# Patient Record
Sex: FEMALE | Race: OTHER | Employment: FULL TIME | URBAN - METROPOLITAN AREA
[De-identification: names, ages, dates, MRNs, and addresses within clinical notes are randomized per-mention and may not be internally consistent; named-entity substitution may affect disease eponyms.]

---

## 2017-02-13 ENCOUNTER — HOSPITAL ENCOUNTER (OUTPATIENT)
Dept: PHYSICAL THERAPY | Age: 32
Discharge: HOME | End: 2017-02-13
Payer: COMMERCIAL

## 2017-02-13 DIAGNOSIS — N94.10 DYSPAREUNIA, FEMALE: ICD-10-CM

## 2017-02-13 PROCEDURE — 97162 PT EVAL MOD COMPLEX 30 MIN: CPT

## 2017-02-13 PROCEDURE — 97110 THERAPEUTIC EXERCISES: CPT

## 2017-02-13 NOTE — PROGRESS NOTES
Ambulatory/Rehab Services H2 Model Falls Risk Assessment    Risk Factor Pts. ·   Confusion/Disorientation/Impulsivity  []    4 ·   Symptomatic Depression  []   2 ·   Altered Elimination  []   1 ·   Dizziness/Vertigo  []   1 ·   Gender (Male)  [x]   1 ·   Any administered antiepileptics (anticonvulsants):  []   2 ·   Any administered benzodiazepines:  []   1 ·   Visual Impairment (specify):  []   1 ·   Portable Oxygen Use  []   1 ·   Orthostatic ? BP  []   1 ·   History of Recent Falls (within 3 mos.)  []   5     Ability to Rise from Chair (choose one) Pts. ·   Ability to rise in a single movement  [x]   0 ·   Pushes up, successful in one attempt  []   1 ·   Multiple attempts, but successful  []   3 ·   Unable to rise without assistance  []   4   Total: (5 or greater = High Risk) 0     Falls Prevention Plan:   []                Physical Limitations to Exercise (specify):   []                Mobility Assistance Device (type):   []                Exercise/Equipment Adaptation (specify):    ©2010 [facility] [address] Patent #[id number].  Federal Law prohibits the replication, distribution or use without written permission from [facility]

## 2017-02-22 ENCOUNTER — HOSPITAL ENCOUNTER (OUTPATIENT)
Dept: PHYSICAL THERAPY | Age: 32
Discharge: HOME | End: 2017-02-22
Payer: COMMERCIAL

## 2017-02-22 PROCEDURE — 97140 MANUAL THERAPY 1/> REGIONS: CPT

## 2017-02-22 NOTE — PROGRESS NOTES
Erin Russell  : 1985 [facility] [place] [address], [facility] [address]  [phone]   [phone]          OUTPATIENT PHYSICAL THERAPY:Daily Note 2017    ICD-10: Treatment Diagnosis: Myalgia (M79.1)  Precautions/Allergies:   Penicillins   Fall Risk Score: 0 (? 5 = High Risk)  MD Orders: Eval and treat MEDICAL/REFERRING DIAGNOSIS:  Myalgia [M79.1]   DATE OF ONSET:10 + years  REFERRING PHYSICIAN: Mara Mott MD  RETURN PHYSICIAN APPOINTMENT: 1 year     INITIAL ASSESSMENT:  Ms. Miah Pedroza presents with increased tone of the pelvic floor and pain with scar tissue mobilization that may be contributing to her pelvic pain and sharp pain with intercourse. . She may also have some contributions from the pudendal n. She presents with tenderness at the external vaginal muscles as well as at the introitus and deep vaginal muscles of the LA and OI. She would benefit from skilled PT to address these deficits with the use of manual therapy, relaxation techniques, behavior modification and modalities as needed. PROBLEM LIST (Impacting functional limitations):  1. Decreased ADL/Functional Activities  2. Increased Pain  3. Decreased Flexibility/Joint Mobility INTERVENTIONS PLANNED:  1. Neuromuscular re-education  2. Biofeedback as needed  3. HEP  4. Electrical Stimulation  5. Manual Therapy  6. Range of Motion (ROM)  7. Therapeutic Activites  8. Therapeutic Exercise/Strengthening   TREATMENT PLAN:  Effective Dates: 2017 TO 2017. Frequency/Duration: 1 time a week for 12 weeks  GOALS: (Goals have been discussed and agreed upon with patient.)  Short-Term Functional Goals: Time Frame: in 3 weeks  1. Patient will demonstrate independence with home exercise program.  2. Pt will demonstrate good relaxation of pelvic floor mm upon command. Discharge Goals: Time Frame: in 12 weeks  1. Pt will be able to tolerate insertion of large dilator. 2. Pt will report no pain with intercourse during insertion or friction. Rehabilitation Potential For Stated Goals: Good  Regarding Lopez Edvin therapy, I certify that the treatment plan above will be carried out by a therapist or under their direction. Thank you for this referral,  Marvel Kahn, PT     Referring Physician Signature: Mag Lennon MD              Date                    The information in this section was collected on 2/13/2017 (except where otherwise noted). HISTORY:   Present Symptoms:  Pain Intensity 1: 0 Pain with intercourse. Pain is sharp. Pain is primarily at perineum. No hip or back problems. Pt reports that she is sore the next day in the perineum. History of Present Injury/Illness (Reason for Referral):  Has always had pain with intercourse. Had second child on August 25, 2017. Has not tried intercourse since then. Sister did have ovarian CA. Past Medical History/Comorbidities:   Ms. Sandra Lee  has a past medical history of Anemia; Ectopic pregnancy (2015); Epilepsy ([facility] [address] and Infertility, female. Ms. Sandra Lee  has a past surgical history that includes salpingo-oophorectomy; other surgical (April 2015); and other surgical (12/2002). Social History/Living Environment:    Lives with  and 2 children  Prior Level of Function/Work/Activity:  Mostly sitting at work. Personal Factors:          Past/Current Experience:  Has had pain with intercourse and pain with first tampon insertion. Does not have a left ovary. Current Medications:    Current Outpatient Prescriptions:     ferrous sulfate (IRON) 325 mg (65 mg iron) tablet, Take  by mouth Daily (before breakfast). , Disp: , Rfl:    Date Last Reviewed:  2/22/2017  Gynecological History:   · Number of pregnancies: 3 pregnancies  , vaginal 2,  · Episiotomy: did tear that was repaired   Past Urinary Medical History:    · History of UTI, Menopause: none significant  · Previous Treatments: has talked to an OB previously. Tried a cream that didn't do anything. Voiding Patterns:  Patient voids every 2-4 hours during the day and 0-1 times during the night. Patient reports that she empties bladder. Bowel Habits:  Patient demonstrates normal bowel habits. Mobility / Self Care: I  Personal / Social History:  · Sexually active? YES: pain is at entrance   Number of Personal Factors/Comorbidities that affect the Plan of Care: 1-2: MODERATE COMPLEXITY   EXAMINATION:   External Observation:   · Voluntary Contraction: present  · Voluntary Relaxation: delayed  · Involuntary Contraction: present  · Involuntary Relaxation: delayed  · Perineal Body Assessment: WNL  · Reflex: WNL  · Anal [place]: Weak  · Skin Integrity: WNL  · Q-tip Test: pain at 1:00, 5:00, 6:00, 7:00 and 11:00  · Vaginal Vault Size: within normal limits  ·   · Palpation:   Right Left   Bulbocavernosus     Ischocavernosus     Superficial Transverse Perineal tenderness tenderness   Sphincter Urethrae Tenderness 4/10 tenderness 2/10   Compressor Urethra  Tenderness 4/10    Urethra-vaginalis     Deep Transverse Perineium Tenderness 4/10    Obturator Internus     Iliococcygeus     Coccygeus     Pubococcygeus     Levator Ani Tenderness 4/10 Tenderness 2/10   Adductor     Psoas           Body Structures Involved:  1. Muscles  2. vestibule of vagina Body Functions Affected:  1. Genitourinary  2. Reproductive  3. Neuromusculoskeletal Activities and Participation Affected:  1. Interpersonal Interactions and Relationships   Number of elements that affect the Plan of Care: 3: MODERATE COMPLEXITY   CLINICAL PRESENTATION:   Presentation: Evolving clinical presentation with changing clinical characteristics: MODERATE COMPLEXITY   CLINICAL DECISION MAKING:   Outcome Measure: Tool Used: Pelvic Floor Disability Index (PFDI-20)  Score:  Initial: 8/10 Most Recent: X (Date: -- )   Interpretation of Score:   This survey asks questions concerning certain  bowel, bladder, or pelvic symptoms and how much this symptoms interfere with daily activiites. Each section is scored on a 0-4 scale, 4 representing the greatest disability. The scores of each section are added together for a total score out of 70. Score 0 1-13 14-27 28-41 42-55 56-69 70   Modifier CH CI CJ CK CL CM CN     Medical Necessity:   · Patient is expected to demonstrate progress in range of motion and coordination to increase independence with intercourse. · Patient demonstrates good rehab potential due to higher previous functional level. Reason for Services/Other Comments:  · Patient continues to require skilled intervention due to increase tenderness at vestibule and poor coordination of pelvic floor muscles contributing to painful intercoures. .   Use of outcome tool(s) and clinical judgement create a POC that gives a: Questionable prediction of patient's progress: MODERATE COMPLEXITY   TREATMENT:   (In addition to Assessment/Re-Assessment sessions the following treatments were rendered)  Pre-treatment Symptoms/Complaints:  Has not tried intercourse. Did well after treatment last week. Pain: Initial:   Pain Intensity 1: 0 0/10 Post Session:  0/10     THERAPEUTIC EXERCISE: (0 minutes):  Exercises per grid below to improve mobility and coordination. Required moderate verbal and tactile cues to promote proper body breathing techniques. Progressed repetitions and complexity of movement as indicated. MANUAL THERAPY: (45 minutes): Soft tissue mobilization was utilized and necessary because of the patient's restricted motion of soft tissue.      SCS and Trigger point to bilateral LA, OI, and perineum to reduce tone and improve tissue elasticity B   Dilators Amin size 2, and [place] size S+    Exercises:  Patient instructed in pelvic floor exercises listed below:   Date:  2/13/2017 Date:  2/22/2017 Date:     Activity/Exercise Parameters Parameters Parameters   Pt education  10 min        Adduction stretch 3 x 30 sec Perineum stretch 3 x 30 sec     Piriformis stretch 3 x 30 sec                           The following educational topics were reviewed with patient:  pelvic floor anatomy, dilators  · Treatment/Session Assessment:  Pt reported no pain with after treatment today. Making good progress. Will continue with dilators. · Response to Treatment:  Excellent   · Compliance with Program/Exercises: Will assess as treatment progresses. · Recommendations/Intent for next treatment session: \"Next visit will focus on advancements to more challenging activities\".   Total Treatment Duration:  PT Patient Time In/Time Out  Time In: 0830  Time Out: [facility],

## 2017-03-03 ENCOUNTER — HOSPITAL ENCOUNTER (OUTPATIENT)
Dept: PHYSICAL THERAPY | Age: 32
Discharge: HOME | End: 2017-03-03
Payer: COMMERCIAL

## 2017-03-03 PROCEDURE — 97140 MANUAL THERAPY 1/> REGIONS: CPT

## 2017-03-03 NOTE — PROGRESS NOTES
Melvi Haque  : 1985 [facility] [place] [address] [facility]  [phone]   [phone]          OUTPATIENT PHYSICAL THERAPY:Daily Note 3/3/2017    ICD-10: Treatment Diagnosis: Myalgia (M79.1)  Precautions/Allergies:   Penicillins   Fall Risk Score: 0 (? 5 = High Risk)  MD Orders: Eval and treat MEDICAL/REFERRING DIAGNOSIS:  Myalgia [M79.1]   DATE OF ONSET:10 + years  REFERRING PHYSICIAN: Sandy Gandara MD  RETURN PHYSICIAN APPOINTMENT: 1 year     INITIAL ASSESSMENT:  Ms. Gideon Esqueda presents with increased tone of the pelvic floor and pain with scar tissue mobilization that may be contributing to her pelvic pain and sharp pain with intercourse. . She may also have some contributions from the pudendal n. She presents with tenderness at the external vaginal muscles as well as at the introitus and deep vaginal muscles of the LA and OI. She would benefit from skilled PT to address these deficits with the use of manual therapy, relaxation techniques, behavior modification and modalities as needed. PROBLEM LIST (Impacting functional limitations):  1. Decreased ADL/Functional Activities  2. Increased Pain  3. Decreased Flexibility/Joint Mobility INTERVENTIONS PLANNED:  1. Neuromuscular re-education  2. Biofeedback as needed  3. HEP  4. Electrical Stimulation  5. Manual Therapy  6. Range of Motion (ROM)  7. Therapeutic Activites  8. Therapeutic Exercise/Strengthening   TREATMENT PLAN:  Effective Dates: 2017 TO 2017. Frequency/Duration: 1 time a week for 12 weeks  GOALS: (Goals have been discussed and agreed upon with patient.)  Short-Term Functional Goals: Time Frame: in 3 weeks  1. Patient will demonstrate independence with home exercise program.  2. Pt will demonstrate good relaxation of pelvic floor mm upon command. Discharge Goals: Time Frame: in 12 weeks  1. Pt will be able to tolerate insertion of large dilator. 2. Pt will report no pain with intercourse during insertion or friction. Rehabilitation Potential For Stated Goals: Good  Regarding Errol Chiu therapy, I certify that the treatment plan above will be carried out by a therapist or under their direction. Thank you for this referral,  Alfredo Weinstein, PT     Referring Physician Signature: Radha Cheatham MD              Date                    The information in this section was collected on 2/13/2017 (except where otherwise noted). HISTORY:   Present Symptoms:  Pain Intensity 1: 0 Pain with intercourse. Pain is sharp. Pain is primarily at perineum. No hip or back problems. Pt reports that she is sore the next day in the perineum. History of Present Injury/Illness (Reason for Referral):  Has always had pain with intercourse. Had second child on August 25, 2017. Has not tried intercourse since then. Sister did have ovarian CA. Past Medical History/Comorbidities:   Ms. Millicent Solorio  has a past medical history of Anemia; Ectopic pregnancy (2015); Epilepsy ([facility] [address] and Infertility, female. Ms. Millicent Solorio  has a past surgical history that includes salpingo-oophorectomy; other surgical (April 2015); and other surgical (12/2002). Social History/Living Environment:    Lives with  and 2 children  Prior Level of Function/Work/Activity:  Mostly sitting at work. Personal Factors:          Past/Current Experience:  Has had pain with intercourse and pain with first tampon insertion. Does not have a left ovary. Current Medications:    Current Outpatient Prescriptions:     ferrous sulfate (IRON) 325 mg (65 mg iron) tablet, Take  by mouth Daily (before breakfast). , Disp: , Rfl:    Date Last Reviewed:  3/3/2017  Gynecological History:   · Number of pregnancies: 3 pregnancies  , vaginal 2,  · Episiotomy: did tear that was repaired   Past Urinary Medical History:    · History of UTI, Menopause: none significant  · Previous Treatments: has talked to an OB previously. Tried a cream that didn't do anything. Voiding Patterns:  Patient voids every 2-4 hours during the day and 0-1 times during the night. Patient reports that she empties bladder. Bowel Habits:  Patient demonstrates normal bowel habits. Mobility / Self Care: I  Personal / Social History:  · Sexually active? YES: pain is at entrance   Number of Personal Factors/Comorbidities that affect the Plan of Care: 1-2: MODERATE COMPLEXITY   EXAMINATION:   External Observation:   · Voluntary Contraction: present  · Voluntary Relaxation: delayed  · Involuntary Contraction: present  · Involuntary Relaxation: delayed  · Perineal Body Assessment: WNL  · Reflex: WNL  · Anal [place]: Weak  · Skin Integrity: WNL  · Q-tip Test: pain at 1:00, 5:00, 6:00, 7:00 and 11:00  · Vaginal Vault Size: within normal limits  ·   · Palpation:   Right Left   Bulbocavernosus     Ischocavernosus     Superficial Transverse Perineal tenderness tenderness   Sphincter Urethrae Tenderness 4/10 tenderness 2/10   Compressor Urethra  Tenderness 4/10    Urethra-vaginalis     Deep Transverse Perineium Tenderness 4/10    Obturator Internus     Iliococcygeus     Coccygeus     Pubococcygeus     Levator Ani Tenderness 4/10 Tenderness 2/10   Adductor     Psoas           Body Structures Involved:  1. Muscles  2. vestibule of vagina Body Functions Affected:  1. Genitourinary  2. Reproductive  3. Neuromusculoskeletal Activities and Participation Affected:  1. Interpersonal Interactions and Relationships   Number of elements that affect the Plan of Care: 3: MODERATE COMPLEXITY   CLINICAL PRESENTATION:   Presentation: Evolving clinical presentation with changing clinical characteristics: MODERATE COMPLEXITY   CLINICAL DECISION MAKING:   Outcome Measure: Tool Used: Pelvic Floor Disability Index (PFDI-20)  Score:  Initial: 8/10 Most Recent: X (Date: -- )   Interpretation of Score:   This survey asks questions concerning certain  bowel, bladder, or pelvic symptoms and how much this symptoms interfere with daily activiites. Each section is scored on a 0-4 scale, 4 representing the greatest disability. The scores of each section are added together for a total score out of 70. Score 0 1-13 14-27 28-41 42-55 56-69 70   Modifier CH CI CJ CK CL CM CN     Medical Necessity:   · Patient is expected to demonstrate progress in range of motion and coordination to increase independence with intercourse. · Patient demonstrates good rehab potential due to higher previous functional level. Reason for Services/Other Comments:  · Patient continues to require skilled intervention due to increase tenderness at vestibule and poor coordination of pelvic floor muscles contributing to painful intercoures. .   Use of outcome tool(s) and clinical judgement create a POC that gives a: Questionable prediction of patient's progress: MODERATE COMPLEXITY   TREATMENT:   (In addition to Assessment/Re-Assessment sessions the following treatments were rendered)  Pre-treatment Symptoms/Complaints:  Pt states the she was more sore for about a day after her last treatment. Pain: Initial:   Pain Intensity 1: 0 0/10 Post Session:  1/10     THERAPEUTIC EXERCISE: (2 minutes):  Exercises per grid below to improve mobility and coordination. Required moderate verbal and tactile cues to promote proper body breathing techniques. Progressed repetitions and complexity of movement as indicated. MANUAL THERAPY: (55 minutes): Soft tissue mobilization was utilized and necessary because of the patient's restricted motion of soft tissue. SCS and Trigger point to bilateral LA, OI, and perineum to reduce tone and improve tissue elasticity B  Low light laser for 15 sec x 2 at perineum level 3.     Dilators [place] size S+    Exercises:  Patient instructed in pelvic floor exercises listed below:   Date:  2/13/2017 Date:  2/22/2017 Date:  3/3/2017   Activity/Exercise Parameters Parameters Parameters   Pt education  10 min        Adduction stretch 3 x 30 sec     Perineum stretch 3 x 30 sec     Piriformis stretch 3 x 30 sec                           The following educational topics were reviewed with patient:  pelvic floor anatomy, dilators  · Treatment/Session Assessment:  Pt did well with therapy. Some soreness at ischial tuberosity on the left. Pain = 1-2/10 after treatment. Making good progress. · Response to Treatment:  Excellent   · Compliance with Program/Exercises: Will assess as treatment progresses. · Recommendations/Intent for next treatment session: \"Next visit will focus on advancements to more challenging activities\".   Total Treatment Duration:  PT Patient Time In/Time Out  Time In: 0730  Time Out: 0830    Mary Yusuf, PT

## 2017-03-08 ENCOUNTER — HOSPITAL ENCOUNTER (OUTPATIENT)
Dept: PHYSICAL THERAPY | Age: 32
Discharge: HOME | End: 2017-03-08
Payer: COMMERCIAL

## 2017-03-08 PROCEDURE — 97140 MANUAL THERAPY 1/> REGIONS: CPT

## 2017-03-08 PROCEDURE — 97110 THERAPEUTIC EXERCISES: CPT

## 2017-03-08 NOTE — PROGRESS NOTES
Serina Caldera  : 1985 [facility] [place] [address], [address]  [phone]   [phone]          OUTPATIENT PHYSICAL THERAPY:Daily Note 3/8/2017    ICD-10: Treatment Diagnosis: Myalgia (M79.1)  Precautions/Allergies:   Penicillins   Fall Risk Score: 0 (? 5 = High Risk)  MD Orders: Eval and treat MEDICAL/REFERRING DIAGNOSIS:  Myalgia [M79.1]   DATE OF ONSET:10 + years  REFERRING PHYSICIAN: Mag Lennon MD  RETURN PHYSICIAN APPOINTMENT: 1 year     INITIAL ASSESSMENT:  Ms. Sandra Lee presents with increased tone of the pelvic floor and pain with scar tissue mobilization that may be contributing to her pelvic pain and sharp pain with intercourse. . She may also have some contributions from the pudendal n. She presents with tenderness at the external vaginal muscles as well as at the introitus and deep vaginal muscles of the LA and OI. She would benefit from skilled PT to address these deficits with the use of manual therapy, relaxation techniques, behavior modification and modalities as needed. PROBLEM LIST (Impacting functional limitations):  1. Decreased ADL/Functional Activities  2. Increased Pain  3. Decreased Flexibility/Joint Mobility INTERVENTIONS PLANNED:  1. Neuromuscular re-education  2. Biofeedback as needed  3. HEP  4. Electrical Stimulation  5. Manual Therapy  6. Range of Motion (ROM)  7. Therapeutic Activites  8. Therapeutic Exercise/Strengthening   TREATMENT PLAN:  Effective Dates: 2017 TO 2017. Frequency/Duration: 1 time a week for 12 weeks  GOALS: (Goals have been discussed and agreed upon with patient.)  Short-Term Functional Goals: Time Frame: in 3 weeks  1. Patient will demonstrate independence with home exercise program.  2. Pt will demonstrate good relaxation of pelvic floor mm upon command. Discharge Goals: Time Frame: in 12 weeks  1. Pt will be able to tolerate insertion of large dilator. 2. Pt will report no pain with intercourse during insertion or friction. Rehabilitation Potential For Stated Goals: Good  Regarding Mike Catalan therapy, I certify that the treatment plan above will be carried out by a therapist or under their direction. Thank you for this referral,  Isidro Arroyo, PT     Referring Physician Signature: Chucky Ortiz MD              Date                    The information in this section was collected on 2/13/2017 (except where otherwise noted). HISTORY:   Present Symptoms:  Pain Intensity 1: 0 Pain with intercourse. Pain is sharp. Pain is primarily at perineum. No hip or back problems. Pt reports that she is sore the next day in the perineum. History of Present Injury/Illness (Reason for Referral):  Has always had pain with intercourse. Had second child on August 25, 2017. Has not tried intercourse since then. Sister did have ovarian CA. Past Medical History/Comorbidities:   Ms. Jordi Lo  has a past medical history of Anemia; Ectopic pregnancy (2015); Epilepsy ([facility] [address] and Infertility, female. Ms. Jordi Lo  has a past surgical history that includes salpingo-oophorectomy; other surgical (April 2015); and other surgical (12/2002). Social History/Living Environment:    Lives with  and 2 children  Prior Level of Function/Work/Activity:  Mostly sitting at work. Personal Factors:          Past/Current Experience:  Has had pain with intercourse and pain with first tampon insertion. Does not have a left ovary. Current Medications:    Current Outpatient Prescriptions:     ferrous sulfate (IRON) 325 mg (65 mg iron) tablet, Take  by mouth Daily (before breakfast). , Disp: , Rfl:    Date Last Reviewed:  3/8/2017  Gynecological History:   · Number of pregnancies: 3 pregnancies  , vaginal 2,  · Episiotomy: did tear that was repaired   Past Urinary Medical History:    · History of UTI, Menopause: none significant  · Previous Treatments: has talked to an OB previously. Tried a cream that didn't do anything. Voiding Patterns:  Patient voids every 2-4 hours during the day and 0-1 times during the night. Patient reports that she empties bladder. Bowel Habits:  Patient demonstrates normal bowel habits. Mobility / Self Care: I  Personal / Social History:  · Sexually active? YES: pain is at entrance   Number of Personal Factors/Comorbidities that affect the Plan of Care: 1-2: MODERATE COMPLEXITY   EXAMINATION:   External Observation:   · Voluntary Contraction: present  · Voluntary Relaxation: delayed  · Involuntary Contraction: present  · Involuntary Relaxation: delayed  · Perineal Body Assessment: WNL  · Reflex: WNL  · Anal [place]: Weak  · Skin Integrity: WNL  · Q-tip Test: pain at 1:00, 5:00, 6:00, 7:00 and 11:00  · Vaginal Vault Size: within normal limits  ·   · Palpation:   Right Left   Bulbocavernosus     Ischocavernosus     Superficial Transverse Perineal tenderness tenderness   Sphincter Urethrae Tenderness 4/10 tenderness 2/10   Compressor Urethra  Tenderness 4/10    Urethra-vaginalis     Deep Transverse Perineium Tenderness 4/10    Obturator Internus     Iliococcygeus     Coccygeus     Pubococcygeus     Levator Ani Tenderness 4/10 Tenderness 2/10   Adductor     Psoas           Body Structures Involved:  1. Muscles  2. vestibule of vagina Body Functions Affected:  1. Genitourinary  2. Reproductive  3. Neuromusculoskeletal Activities and Participation Affected:  1. Interpersonal Interactions and Relationships   Number of elements that affect the Plan of Care: 3: MODERATE COMPLEXITY   CLINICAL PRESENTATION:   Presentation: Evolving clinical presentation with changing clinical characteristics: MODERATE COMPLEXITY   CLINICAL DECISION MAKING:   Outcome Measure: Tool Used: Pelvic Floor Disability Index (PFDI-20)  Score:  Initial: 8/10 Most Recent: X (Date: -- )   Interpretation of Score:   This survey asks questions concerning certain  bowel, bladder, or pelvic symptoms and how much this symptoms interfere with daily activiites. Each section is scored on a 0-4 scale, 4 representing the greatest disability. The scores of each section are added together for a total score out of 70. Score 0 1-13 14-27 28-41 42-55 56-69 70   Modifier CH CI CJ CK CL CM CN     Medical Necessity:   · Patient is expected to demonstrate progress in range of motion and coordination to increase independence with intercourse. · Patient demonstrates good rehab potential due to higher previous functional level. Reason for Services/Other Comments:  · Patient continues to require skilled intervention due to increase tenderness at vestibule and poor coordination of pelvic floor muscles contributing to painful intercoures. .   Use of outcome tool(s) and clinical judgement create a POC that gives a: Questionable prediction of patient's progress: MODERATE COMPLEXITY   TREATMENT:   (In addition to Assessment/Re-Assessment sessions the following treatments were rendered)  Pre-treatment Symptoms/Complaints:  Pt reported that she was less sore after treatment at her last visit. .  Doing well overall. Pain: Initial:   Pain Intensity 1: 0 0/10 Post Session:  1/10     THERAPEUTIC EXERCISE: (8 minutes):  Exercises per grid below to improve mobility and coordination. Required moderate verbal and tactile cues to promote proper body breathing techniques. Progressed repetitions and complexity of movement as indicated. MANUAL THERAPY: (45 minutes): Soft tissue mobilization was utilized and necessary because of the patient's restricted motion of soft tissue. SCS and Trigger point to bilateral LA, OI, and perineum to reduce tone and improve tissue elasticity B. Soft tissue to bilateral adductors  Low light laser for 15 sec x 2 at perineum level 3.     Dilators [place] size S+, M- and M      Exercises:  Patient instructed in pelvic floor exercises listed below:   Date:  3/8/2017   Activity/Exercise    Pt education     Adduction stretch 3 x 30 sec   Perineum stretch 3 x 30 sec   Piriformis stretch    Hamstring 3 x 30 sec               The following educational topics were reviewed with patient:  pelvic floor anatomy, dilators  · Treatment/Session Assessment:  Pt did excellent with therapy today. No increased soreness after treatment. Making good progress. · Response to Treatment:  Excellent   · Compliance with Program/Exercises: Will assess as treatment progresses. · Recommendations/Intent for next treatment session: \"Next visit will focus on advancements to more challenging activities\".   Total Treatment Duration:  PT Patient Time In/Time Out  Time In: 0730  Time Out: 0830    Ion Barragan PT

## 2017-03-13 ENCOUNTER — HOSPITAL ENCOUNTER (OUTPATIENT)
Dept: PHYSICAL THERAPY | Age: 32
Discharge: HOME | End: 2017-03-13
Payer: COMMERCIAL

## 2017-03-13 PROCEDURE — 97140 MANUAL THERAPY 1/> REGIONS: CPT

## 2017-03-13 PROCEDURE — 97110 THERAPEUTIC EXERCISES: CPT

## 2017-03-13 NOTE — PROGRESS NOTES
Johanna Chavez  : 1985 [facility]  [address], [address] [place] [address],[address] [facility]  [phone]   [phone]          OUTPATIENT PHYSICAL THERAPY:Daily Note 3/13/2017    ICD-10: Treatment Diagnosis: Myalgia (M79.1)  Precautions/Allergies:   Penicillins   Fall Risk Score: 0 (? 5 = High Risk)  MD Orders: Eval and treat MEDICAL/REFERRING DIAGNOSIS:  Myalgia [M79.1]   DATE OF ONSET:10 + years  REFERRING PHYSICIAN: Umer Summers MD  RETURN PHYSICIAN APPOINTMENT: 1 year     INITIAL ASSESSMENT:  Ms. Violeta Lozano presents with increased tone of the pelvic floor and pain with scar tissue mobilization that may be contributing to her pelvic pain and sharp pain with intercourse. . She may also have some contributions from the pudendal n. She presents with tenderness at the external vaginal muscles as well as at the introitus and deep vaginal muscles of the LA and OI. She would benefit from skilled PT to address these deficits with the use of manual therapy, relaxation techniques, behavior modification and modalities as needed. PROBLEM LIST (Impacting functional limitations):  1. Decreased ADL/Functional Activities  2. Increased Pain  3. Decreased Flexibility/Joint Mobility INTERVENTIONS PLANNED:  1. Neuromuscular re-education  2. Biofeedback as needed  3. HEP  4. Electrical Stimulation  5. Manual Therapy  6. Range of Motion (ROM)  7. Therapeutic Activites  8. Therapeutic Exercise/Strengthening   TREATMENT PLAN:  Effective Dates: 2017 TO 2017. Frequency/Duration: 1 time a week for 12 weeks  GOALS: (Goals have been discussed and agreed upon with patient.)  Short-Term Functional Goals: Time Frame: in 3 weeks  1. Patient will demonstrate independence with home exercise program.  2. Pt will demonstrate good relaxation of pelvic floor mm upon command. Discharge Goals: Time Frame: in 12 weeks  1. Pt will be able to tolerate insertion of large dilator. 2. Pt will report no pain with intercourse during insertion or friction. Rehabilitation Potential For Stated Goals: Good  Regarding Jason Naples therapy, I certify that the treatment plan above will be carried out by a therapist or under their direction. Thank you for this referral,  Darcy Camacho, PT     Referring Physician Signature: Debbi Cruz MD              Date                    The information in this section was collected on 2/13/2017 (except where otherwise noted). HISTORY:   Present Symptoms:  Pain Intensity 1: 0 Pain with intercourse. Pain is sharp. Pain is primarily at perineum. No hip or back problems. Pt reports that she is sore the next day in the perineum. History of Present Injury/Illness (Reason for Referral):  Has always had pain with intercourse. Had second child on August 25, 2017. Has not tried intercourse since then. Sister did have ovarian CA. Past Medical History/Comorbidities:   Ms. Yasmeen Barragan  has a past medical history of Anemia; Ectopic pregnancy (2015); Epilepsy ([facility] [address] and Infertility, female. Ms. Yasmeen Barragan  has a past surgical history that includes salpingo-oophorectomy; other surgical (April 2015); and other surgical (12/2002). Social History/Living Environment:    Lives with  and 2 children  Prior Level of Function/Work/Activity:  Mostly sitting at work. Personal Factors:          Past/Current Experience:  Has had pain with intercourse and pain with first tampon insertion. Does not have a left ovary. Current Medications:    Current Outpatient Prescriptions:     ferrous sulfate (IRON) 325 mg (65 mg iron) tablet, Take  by mouth Daily (before breakfast). , Disp: , Rfl:    Date Last Reviewed:  3/13/2017  Gynecological History:   · Number of pregnancies: 3 pregnancies  , vaginal 2,  · Episiotomy: did tear that was repaired   Past Urinary Medical History:    · History of UTI, Menopause: none significant  · Previous Treatments: has talked to an OB previously. Tried a cream that didn't do anything. Voiding Patterns:  Patient voids every 2-4 hours during the day and 0-1 times during the night. Patient reports that she empties bladder. Bowel Habits:  Patient demonstrates normal bowel habits. Mobility / Self Care: I  Personal / Social History:  · Sexually active? YES: pain is at entrance   Number of Personal Factors/Comorbidities that affect the Plan of Care: 1-2: MODERATE COMPLEXITY   EXAMINATION:   External Observation:   · Voluntary Contraction: present  · Voluntary Relaxation: delayed  · Involuntary Contraction: present  · Involuntary Relaxation: delayed  · Perineal Body Assessment: WNL  · Reflex: WNL  · Anal [place]: Weak  · Skin Integrity: WNL  · Q-tip Test: pain at 1:00, 5:00, 6:00, 7:00 and 11:00  · Vaginal Vault Size: within normal limits  ·   · Palpation:   Right Left   Bulbocavernosus     Ischocavernosus     Superficial Transverse Perineal tenderness tenderness   Sphincter Urethrae Tenderness 4/10 tenderness 2/10   Compressor Urethra  Tenderness 4/10    Urethra-vaginalis     Deep Transverse Perineium Tenderness 4/10    Obturator Internus     Iliococcygeus     Coccygeus     Pubococcygeus     Levator Ani Tenderness 4/10 Tenderness 2/10   Adductor     Psoas           Body Structures Involved:  1. Muscles  2. vestibule of vagina Body Functions Affected:  1. Genitourinary  2. Reproductive  3. Neuromusculoskeletal Activities and Participation Affected:  1. Interpersonal Interactions and Relationships   Number of elements that affect the Plan of Care: 3: MODERATE COMPLEXITY   CLINICAL PRESENTATION:   Presentation: Evolving clinical presentation with changing clinical characteristics: MODERATE COMPLEXITY   CLINICAL DECISION MAKING:   Outcome Measure: Tool Used: Pelvic Floor Disability Index (PFDI-20)  Score:  Initial: 8/10 Most Recent: X (Date: -- )   Interpretation of Score:   This survey asks questions concerning certain  bowel, bladder, or pelvic symptoms and how much this symptoms interfere with daily activiites. Each section is scored on a 0-4 scale, 4 representing the greatest disability. The scores of each section are added together for a total score out of 70. Score 0 1-13 14-27 28-41 42-55 56-69 70   Modifier CH CI CJ CK CL CM CN     Medical Necessity:   · Patient is expected to demonstrate progress in range of motion and coordination to increase independence with intercourse. · Patient demonstrates good rehab potential due to higher previous functional level. Reason for Services/Other Comments:  · Patient continues to require skilled intervention due to increase tenderness at vestibule and poor coordination of pelvic floor muscles contributing to painful intercoures. .   Use of outcome tool(s) and clinical judgement create a POC that gives a: Questionable prediction of patient's progress: MODERATE COMPLEXITY   TREATMENT:   (In addition to Assessment/Re-Assessment sessions the following treatments were rendered)  Pre-treatment Symptoms/Complaints: Pt reported pain at the ischial tuberosities after therapy for several hours. Has not tried intercourse yet. Pain: Initial:   Pain Intensity 1: 0 0/10 Post Session:  0/10     THERAPEUTIC EXERCISE: (10 minutes):  Exercises per grid below to improve mobility and coordination. Required moderate verbal and tactile cues to promote proper body breathing techniques. Progressed repetitions and complexity of movement as indicated. MANUAL THERAPY: (45 minutes): Soft tissue mobilization was utilized and necessary because of the patient's restricted motion of soft tissue. SCS and Trigger point to bilateral LA, OI, and perineum to reduce tone and improve tissue elasticity B. Soft tissue to bilateral adductors  Low light laser for 15 sec x 2 at perineum level 3.     Dilators Amin size 4 and 5 no pain noted    Exercises:  Patient instructed in pelvic floor exercises listed below:   Date:  3/8/2017 Date:  3/13/2017   Activity/Exercise     Pt education      Adduction stretch 3 x 30 sec 3 x 30 sec   Perineum stretch 3 x 30 sec 3 x 30 sec   Piriformis stretch     Hamstring 3 x 30 sec 3 x 30 sec   Prone heel squeezes  X 5 x 10 sec   Prone hip extension  X 10 each       The following educational topics were reviewed with patient:  pelvic floor anatomy, dilators  · Treatment/Session Assessment: Pt reported no increased pain after treatment. · Response to Treatment:  Excellent   · Compliance with Program/Exercises: Will assess as treatment progresses. · Recommendations/Intent for next treatment session: \"Next visit will focus on advancements to more challenging activities\".   Total Treatment Duration:  PT Patient Time In/Time Out  Time In: 0730  Time Out: 0830    Kingsley Turcios, PT

## 2017-03-17 ENCOUNTER — HOSPITAL ENCOUNTER (OUTPATIENT)
Dept: PHYSICAL THERAPY | Age: 32
Discharge: HOME | End: 2017-03-17
Payer: COMMERCIAL

## 2017-03-17 PROCEDURE — 97140 MANUAL THERAPY 1/> REGIONS: CPT

## 2017-03-22 ENCOUNTER — HOSPITAL ENCOUNTER (OUTPATIENT)
Dept: PHYSICAL THERAPY | Age: 32
Discharge: HOME | End: 2017-03-22
Payer: COMMERCIAL

## 2017-03-22 PROCEDURE — 97110 THERAPEUTIC EXERCISES: CPT

## 2017-03-22 PROCEDURE — 97140 MANUAL THERAPY 1/> REGIONS: CPT

## 2017-03-22 NOTE — PROGRESS NOTES
Yves Dang  : 1985 [facility] [place] [address], [address] [facility]  [phone]   [phone]          OUTPATIENT PHYSICAL THERAPY:Daily Note and Progress Report 3/22/2017    ICD-10: Treatment Diagnosis: Myalgia (M79.1)  Precautions/Allergies:   Penicillins   Fall Risk Score: 0 (? 5 = High Risk)  MD Orders: Eval and treat MEDICAL/REFERRING DIAGNOSIS:  Myalgia [M79.1]   DATE OF ONSET:10 + years  REFERRING PHYSICIAN: Isabella Anaya MD  RETURN PHYSICIAN APPOINTMENT: 1 year     INITIAL ASSESSMENT:  Ms. Mason Campos has been seen for 8 physical therapy visits. She has good improvements in the 3rd layer of the pelvic floor with reports of tenderness and decreased tone. Pt is now able to tolerate insertion of 5 Amin dilator. She has met all of short term goals and is working towards her long term goals. She does continue to have increased tenderness in superficial and deep perirenal. She continues to benefit from skilled PT to address remaining deficits and meet long term goals. PROBLEM LIST (Impacting functional limitations):  1. Decreased ADL/Functional Activities  2. Increased Pain  3. Decreased Flexibility/Joint Mobility INTERVENTIONS PLANNED:  1. Neuromuscular re-education  2. Biofeedback as needed  3. HEP  4. Electrical Stimulation  5. Manual Therapy  6. Range of Motion (ROM)  7. Therapeutic Activites  8. Therapeutic Exercise/Strengthening   TREATMENT PLAN:  Effective Dates: 2017 TO 2017. Frequency/Duration: 1 time a week for 12 weeks  GOALS: (Goals have been discussed and agreed upon with patient.)  Short-Term Functional Goals: Time Frame: in 3 weeks  1. Patient will demonstrate independence with home exercise program. (met)  2. Pt will demonstrate good relaxation of pelvic floor mm upon command. (met)  Discharge Goals: Time Frame: in 12 weeks  1. Pt will be able to tolerate insertion of large dilator.    2. Pt will report no pain with intercourse during insertion or friction. Rehabilitation Potential For Stated Goals: Good  Regarding George Lópezmann therapy, I certify that the treatment plan above will be carried out by a therapist or under their direction. Thank you for this referral,  Los Rodriges, PT     Referring Physician Signature: Zoran Ruiz MD              Date                    The information in this section was collected on 2/13/2017 (except where otherwise noted). HISTORY:   Present Symptoms:  Pain Intensity 1: 0 Pain with intercourse. Pain is sharp. Pain is primarily at perineum. No hip or back problems. Pt reports that she is sore the next day in the perineum. History of Present Injury/Illness (Reason for Referral):   Has always had pain with intercourse. Had second child on August 25, 2017. Has not tried intercourse since then. Sister did have ovarian CA. Past Medical History/Comorbidities:   Ms. Lawrence Ross  has a past medical history of Anemia; Ectopic pregnancy (2015); Epilepsy ([facility] [address] and Infertility, female. Ms. Lawrence Ross  has a past surgical history that includes salpingo-oophorectomy; other surgical (April 2015); and other surgical (12/2002). Social History/Living Environment:    Lives with  and 2 children  Prior Level of Function/Work/Activity:  Mostly sitting at work. Personal Factors:          Past/Current Experience:  Has had pain with intercourse and pain with first tampon insertion. Does not have a left ovary. Current Medications:    Current Outpatient Prescriptions:     ferrous sulfate (IRON) 325 mg (65 mg iron) tablet, Take  by mouth Daily (before breakfast). , Disp: , Rfl:    Date Last Reviewed:  3/22/2017  Gynecological History:   · Number of pregnancies: 3 pregnancies  , vaginal 2,  · Episiotomy: did tear that was repaired   Past Urinary Medical History:    · History of UTI, Menopause: none significant  · Previous Treatments: has talked to an OB previously. Tried a cream that didn't do anything. Voiding Patterns:  Patient voids every 2-4 hours during the day and 0-1 times during the night. Patient reports that she empties bladder. Bowel Habits:  Patient demonstrates normal bowel habits. Mobility / Self Care: I  Personal / Social History:  · Sexually active? YES: pain is at entrance   Number of Personal Factors/Comorbidities that affect the Plan of Care: 1-2: MODERATE COMPLEXITY   EXAMINATION:   External Observation:   · Voluntary Contraction: present  · Voluntary Relaxation: delayed  · Involuntary Contraction: present  · Involuntary Relaxation: delayed  · Perineal Body Assessment: WNL  · Reflex: WNL  · Anal [place]: Weak  · Skin Integrity: WNL  · Q-tip Test: pain at 1:00, 5:00, 6:00, 7:00 and 11:00  · Vaginal Vault Size: within normal limits  ·   · Palpation:   Right Left   Bulbocavernosus     Ischocavernosus     Superficial Transverse Perineal tenderness tenderness   Sphincter Urethrae Tenderness 4/10 tenderness 2/10   Compressor Urethra  Tenderness 4/10    Urethra-vaginalis     Deep Transverse Perineium Tenderness 4/10    Obturator Internus     Iliococcygeus     Coccygeus     Pubococcygeus     Levator Ani  Tenderness 2/10   Adductor     Psoas           Body Structures Involved:  1. Muscles  2. vestibule of vagina Body Functions Affected:  1. Genitourinary  2. Reproductive  3. Neuromusculoskeletal Activities and Participation Affected:  1. Interpersonal Interactions and Relationships   Number of elements that affect the Plan of Care: 3: MODERATE COMPLEXITY   CLINICAL PRESENTATION:   Presentation: Evolving clinical presentation with changing clinical characteristics: MODERATE COMPLEXITY   CLINICAL DECISION MAKING:   Outcome Measure: Tool Used: Pelvic Floor Disability Index (PFDI-20)  Score:  Initial: 8/10 Most Recent: X (Date: -- )   Interpretation of Score:   This survey asks questions concerning certain  bowel, bladder, or pelvic symptoms and how much this symptoms interfere with daily activiites. Each section is scored on a 0-4 scale, 4 representing the greatest disability. The scores of each section are added together for a total score out of 70. Score 0 1-13 14-27 28-41 42-55 56-69 70   Modifier CH CI CJ CK CL CM CN     Medical Necessity:   · Patient is expected to demonstrate progress in range of motion and coordination to increase independence with intercourse. · Patient demonstrates good rehab potential due to higher previous functional level. Reason for Services/Other Comments:  · Patient continues to require skilled intervention due to increase tenderness at vestibule and poor coordination of pelvic floor muscles contributing to painful intercoures. .   Use of outcome tool(s) and clinical judgement create a POC that gives a: Questionable prediction of patient's progress: MODERATE COMPLEXITY   TREATMENT:   (In addition to Assessment/Re-Assessment sessions the following treatments were rendered)  Pre-treatment Symptoms/Complaints: Pt reported pain at the ischial tuberosities after therapy for a 1-2 hours. Has not tried intercourse yet. Pain: Initial:   Pain Intensity 1: 0 0/10 Post Session:  0/10     THERAPEUTIC EXERCISE: (15 minutes):  Exercises per grid below to improve mobility and coordination. Required moderate verbal and tactile cues to promote proper body breathing techniques. Progressed repetitions and complexity of movement as indicated. MANUAL THERAPY: (40 minutes): Soft tissue mobilization was utilized and necessary because of the patient's restricted motion of soft tissue. SCS and Trigger point to bilateral LA, OI, and perineum to reduce tone and improve tissue elasticity B. Soft tissue to bilateral adductors  Low light laser for 15 sec x 2 at perineum level 3. Dilators Amin size 4 and 5, and 6  no pain noted 4, 5.  Dilator 6 had pain at 1-3/10    Exercises:  Patient instructed in pelvic floor exercises listed below: Date:  3/8/2017 Date:  3/13/2017 Date:  3/22/2017   Activity/Exercise      Pt education       Adduction stretch 3 x 30 sec 3 x 30 sec 3 x 30 sec   Perineum stretch 3 x 30 sec 3 x 30 sec 3 x 30 sec   Piriformis stretch      Hamstring 3 x 30 sec 3 x 30 sec 3 x 30 sec   Prone heel squeezes  X 5 x 10 sec x5 x 10 sec   Prone hip extension  X 10 each X 10 each       The following educational topics were reviewed with patient:  pelvic floor anatomy, dilators  · Treatment/Session Assessment: She did have some pain with dilators at 2/10. Pain resolved quickly. No pain after treatment. · Response to Treatment:  Excellent   · Compliance with Program/Exercises: Will assess as treatment progresses. · Recommendations/Intent for next treatment session: \"Next visit will focus on advancements to more challenging activities\".   Total Treatment Duration:  PT Patient Time In/Time Out  Time In: 1000  Time Out: 1100    Marvel Kahn PT

## 2017-03-24 ENCOUNTER — APPOINTMENT (OUTPATIENT)
Dept: PHYSICAL THERAPY | Age: 32
End: 2017-03-24
Payer: COMMERCIAL

## 2017-03-31 ENCOUNTER — HOSPITAL ENCOUNTER (OUTPATIENT)
Dept: PHYSICAL THERAPY | Age: 32
Discharge: HOME | End: 2017-03-31
Payer: COMMERCIAL

## 2017-03-31 PROCEDURE — 97140 MANUAL THERAPY 1/> REGIONS: CPT

## 2017-03-31 NOTE — PROGRESS NOTES
Eli Flower  : 1985 [facility] [place] [address]  [phone]   [phone]          OUTPATIENT PHYSICAL THERAPY:Daily Note 3/31/2017    ICD-10: Treatment Diagnosis: Myalgia (M79.1)  Precautions/Allergies:   Penicillins   Fall Risk Score: 0 (? 5 = High Risk)  MD Orders: Eval and treat MEDICAL/REFERRING DIAGNOSIS:  Myalgia [M79.1]   DATE OF ONSET:10 + years  REFERRING PHYSICIAN: Tamika Meneses MD  RETURN PHYSICIAN APPOINTMENT: 1 year     INITIAL ASSESSMENT:  Ms. Nedra Oliva has been seen for 8 physical therapy visits. She has good improvements in the 3rd layer of the pelvic floor with reports of tenderness and decreased tone. Pt is now able to tolerate insertion of 5 Amin dilator. She has met all of short term goals and is working towards her long term goals. She does continue to have increased tenderness in superficial and deep perirenal. She continues to benefit from skilled PT to address remaining deficits and meet long term goals. PROBLEM LIST (Impacting functional limitations):  1. Decreased ADL/Functional Activities  2. Increased Pain  3. Decreased Flexibility/Joint Mobility INTERVENTIONS PLANNED:  1. Neuromuscular re-education  2. Biofeedback as needed  3. HEP  4. Electrical Stimulation  5. Manual Therapy  6. Range of Motion (ROM)  7. Therapeutic Activites  8. Therapeutic Exercise/Strengthening   TREATMENT PLAN:  Effective Dates: 2017 TO 2017. Frequency/Duration: 1 time a week for 12 weeks  GOALS: (Goals have been discussed and agreed upon with patient.)  Short-Term Functional Goals: Time Frame: in 3 weeks  1. Patient will demonstrate independence with home exercise program. (met)  2. Pt will demonstrate good relaxation of pelvic floor mm upon command. (met)  Discharge Goals: Time Frame: in 12 weeks  1. Pt will be able to tolerate insertion of large dilator.    2. Pt will report no pain with intercourse during insertion or friction. Rehabilitation Potential For Stated Goals: Good  Regarding Lulu Heal therapy, I certify that the treatment plan above will be carried out by a therapist or under their direction. Thank you for this referral,  Duyen Rai PT     Referring Physician Signature: Elenita Villalta MD              Date                    The information in this section was collected on 2/13/2017 (except where otherwise noted). HISTORY:   Present Symptoms:  Pain Intensity 1: 0 Pain with intercourse. Pain is sharp. Pain is primarily at perineum. No hip or back problems. Pt reports that she is sore the next day in the perineum. History of Present Injury/Illness (Reason for Referral):   Has always had pain with intercourse. Had second child on August 25, 2017. Has not tried intercourse since then. Sister did have ovarian CA. Past Medical History/Comorbidities:   Ms. Dk Nash  has a past medical history of Anemia; Ectopic pregnancy (2015); Epilepsy ([facility] [address] and Infertility, female. Ms. kD Nash  has a past surgical history that includes salpingo-oophorectomy; other surgical (April 2015); and other surgical (12/2002). Social History/Living Environment:    Lives with  and 2 children  Prior Level of Function/Work/Activity:  Mostly sitting at work. Personal Factors:          Past/Current Experience:  Has had pain with intercourse and pain with first tampon insertion. Does not have a left ovary. Current Medications:    Current Outpatient Prescriptions:     ferrous sulfate (IRON) 325 mg (65 mg iron) tablet, Take  by mouth Daily (before breakfast). , Disp: , Rfl:    Date Last Reviewed:  3/31/2017  Gynecological History:   · Number of pregnancies: 3 pregnancies  , vaginal 2,  · Episiotomy: did tear that was repaired   Past Urinary Medical History:    · History of UTI, Menopause: none significant  · Previous Treatments: has talked to an OB previously.   Tried a cream that didn't do anything. Voiding Patterns:  Patient voids every 2-4 hours during the day and 0-1 times during the night. Patient reports that she empties bladder. Bowel Habits:  Patient demonstrates normal bowel habits. Mobility / Self Care: I  Personal / Social History:  · Sexually active? YES: pain is at entrance   Number of Personal Factors/Comorbidities that affect the Plan of Care: 1-2: MODERATE COMPLEXITY   EXAMINATION:   External Observation:   · Voluntary Contraction: present  · Voluntary Relaxation: delayed  · Involuntary Contraction: present  · Involuntary Relaxation: delayed  · Perineal Body Assessment: WNL  · Reflex: WNL  · Anal [place]: Weak  · Skin Integrity: WNL  · Q-tip Test: pain at 1:00, 5:00, 6:00, 7:00 and 11:00  · Vaginal Vault Size: within normal limits  ·   · Palpation:   Right Left   Bulbocavernosus     Ischocavernosus     Superficial Transverse Perineal tenderness tenderness   Sphincter Urethrae Tenderness 4/10 tenderness 2/10   Compressor Urethra  Tenderness 4/10    Urethra-vaginalis     Deep Transverse Perineium Tenderness 4/10    Obturator Internus     Iliococcygeus     Coccygeus     Pubococcygeus     Levator Ani  Tenderness 2/10   Adductor     Psoas           Body Structures Involved:  1. Muscles  2. vestibule of vagina Body Functions Affected:  1. Genitourinary  2. Reproductive  3. Neuromusculoskeletal Activities and Participation Affected:  1. Interpersonal Interactions and Relationships   Number of elements that affect the Plan of Care: 3: MODERATE COMPLEXITY   CLINICAL PRESENTATION:   Presentation: Evolving clinical presentation with changing clinical characteristics: MODERATE COMPLEXITY   CLINICAL DECISION MAKING:   Outcome Measure: Tool Used: Pelvic Floor Disability Index (PFDI-20)  Score:  Initial: 8/10 Most Recent: X (Date: -- )   Interpretation of Score:   This survey asks questions concerning certain  bowel, bladder, or pelvic symptoms and how much this symptoms interfere with daily activiites. Each section is scored on a 0-4 scale, 4 representing the greatest disability. The scores of each section are added together for a total score out of 70. Score 0 1-13 14-27 28-41 42-55 56-69 70   Modifier CH CI CJ CK CL CM CN     Medical Necessity:   · Patient is expected to demonstrate progress in range of motion and coordination to increase independence with intercourse. · Patient demonstrates good rehab potential due to higher previous functional level. Reason for Services/Other Comments:  · Patient continues to require skilled intervention due to increase tenderness at vestibule and poor coordination of pelvic floor muscles contributing to painful intercoures. .   Use of outcome tool(s) and clinical judgement create a POC that gives a: Questionable prediction of patient's progress: MODERATE COMPLEXITY   TREATMENT:   (In addition to Assessment/Re-Assessment sessions the following treatments were rendered)  Pre-treatment Symptoms/Complaints: Doing well   Pain: Initial:   Pain Intensity 1: 0 0/10 Post Session:  0/10     THERAPEUTIC EXERCISE: (0 minutes):  Exercises per grid below to improve mobility and coordination. Required moderate verbal and tactile cues to promote proper body breathing techniques. Progressed repetitions and complexity of movement as indicated. MANUAL THERAPY: (55 minutes): Soft tissue mobilization was utilized and necessary because of the patient's restricted motion of soft tissue. SCS and Trigger point to bilateral LA, OI, and perineum to reduce tone and improve tissue elasticity B. Soft tissue to bilateral adductors  Low light laser for 15 sec x 2 at perineum level 3. Dilators Amin size 4 and 5, and 6  no pain noted 4, 5.  Dilator 4 had pain at 1/10    Exercises:  Patient instructed in pelvic floor exercises listed below:   Date:  3/8/2017 Date:  3/13/2017 Date:  3/22/2017   Activity/Exercise      Pt education       Adduction stretch 3 x 30 sec 3 x 30 sec 3 x 30 sec   Perineum stretch 3 x 30 sec 3 x 30 sec 3 x 30 sec   Piriformis stretch      Hamstring 3 x 30 sec 3 x 30 sec 3 x 30 sec   Prone heel squeezes  X 5 x 10 sec x5 x 10 sec   Prone hip extension  X 10 each X 10 each       The following educational topics were reviewed with patient:  pelvic floor anatomy, dilators  · Treatment/Session Assessment: She did have some pain with first dilator at perineum at 1/10. Pain resolved quickly. No pain with any other dilator today. She has some tenderness at perineum after treatment of 1/10. · Response to Treatment:  Excellent   · Compliance with Program/Exercises: Will assess as treatment progresses. · Recommendations/Intent for next treatment session: \"Next visit will focus on advancements to more challenging activities\".   Total Treatment Duration:  PT Patient Time In/Time Out  Time In: 0730  Time Out: 0830    Liliana Carmichael, PT

## 2017-04-07 ENCOUNTER — HOSPITAL ENCOUNTER (OUTPATIENT)
Dept: PHYSICAL THERAPY | Age: 32
Discharge: HOME | End: 2017-04-07
Payer: COMMERCIAL

## 2017-04-07 PROCEDURE — 97140 MANUAL THERAPY 1/> REGIONS: CPT

## 2017-04-07 PROCEDURE — 97110 THERAPEUTIC EXERCISES: CPT

## 2017-04-07 NOTE — PROGRESS NOTES
Marcio Anglees  : 1985 [facility]  [address] [facility]  [phone]   [phone]          OUTPATIENT PHYSICAL THERAPY:Daily Note 2017    ICD-10: Treatment Diagnosis: Myalgia (M79.1)  Precautions/Allergies:   Penicillins   Fall Risk Score: 0 (? 5 = High Risk)  MD Orders: Eval and treat MEDICAL/REFERRING DIAGNOSIS:  Myalgia [M79.1]   DATE OF ONSET:10 + years  REFERRING PHYSICIAN: Sujatha Burrell MD  RETURN PHYSICIAN APPOINTMENT: 1 year     INITIAL ASSESSMENT:  Ms. Lalito Uriostegui has been seen for 8 physical therapy visits. She has good improvements in the 3rd layer of the pelvic floor with reports of tenderness and decreased tone. Pt is now able to tolerate insertion of 5 Amin dilator. She has met all of short term goals and is working towards her long term goals. She does continue to have increased tenderness in superficial and deep perirenal. She continues to benefit from skilled PT to address remaining deficits and meet long term goals. PROBLEM LIST (Impacting functional limitations):  1. Decreased ADL/Functional Activities  2. Increased Pain  3. Decreased Flexibility/Joint Mobility INTERVENTIONS PLANNED:  1. Neuromuscular re-education  2. Biofeedback as needed  3. HEP  4. Electrical Stimulation  5. Manual Therapy  6. Range of Motion (ROM)  7. Therapeutic Activites  8. Therapeutic Exercise/Strengthening   TREATMENT PLAN:  Effective Dates: 2017 TO 2017. Frequency/Duration: 1 time a week for 12 weeks  GOALS: (Goals have been discussed and agreed upon with patient.)  Short-Term Functional Goals: Time Frame: in 3 weeks  1. Patient will demonstrate independence with home exercise program. (met)  2. Pt will demonstrate good relaxation of pelvic floor mm upon command. (met)  Discharge Goals: Time Frame: in 12 weeks  1. Pt will be able to tolerate insertion of large dilator.    2. Pt will report no pain with intercourse during insertion or friction. Rehabilitation Potential For Stated Goals: Good  Regarding Alcides Alfaro therapy, I certify that the treatment plan above will be carried out by a therapist or under their direction. Thank you for this referral,  Jenna Samano, PT     Referring Physician Signature: Mica Maddox MD              Date                    The information in this section was collected on 2/13/2017 (except where otherwise noted). HISTORY:   Present Symptoms:  Pain Intensity 1: 0 Pain with intercourse. Pain is sharp. Pain is primarily at perineum. No hip or back problems. Pt reports that she is sore the next day in the perineum. History of Present Injury/Illness (Reason for Referral):   Has always had pain with intercourse. Had second child on August 25, 2017. Has not tried intercourse since then. Sister did have ovarian CA. Past Medical History/Comorbidities:   Ms. Mariya Sumner  has a past medical history of Anemia; Ectopic pregnancy (2015); Epilepsy ([facility] [address] and Infertility, female. Ms. Mariya Sumner  has a past surgical history that includes salpingo-oophorectomy; other surgical (April 2015); and other surgical (12/2002). Social History/Living Environment:    Lives with  and 2 children  Prior Level of Function/Work/Activity:  Mostly sitting at work. Personal Factors:          Past/Current Experience:  Has had pain with intercourse and pain with first tampon insertion. Does not have a left ovary. Current Medications:    Current Outpatient Prescriptions:     ferrous sulfate (IRON) 325 mg (65 mg iron) tablet, Take  by mouth Daily (before breakfast). , Disp: , Rfl:    Date Last Reviewed:  4/7/2017  Gynecological History:   · Number of pregnancies: 3 pregnancies  , vaginal 2,  · Episiotomy: did tear that was repaired   Past Urinary Medical History:    · History of UTI, Menopause: none significant  · Previous Treatments: has talked to an OB previously.   Tried a cream that didn't do anything. Voiding Patterns:  Patient voids every 2-4 hours during the day and 0-1 times during the night. Patient reports that she empties bladder. Bowel Habits:  Patient demonstrates normal bowel habits. Mobility / Self Care: I  Personal / Social History:  · Sexually active? YES: pain is at entrance   Number of Personal Factors/Comorbidities that affect the Plan of Care: 1-2: MODERATE COMPLEXITY   EXAMINATION:   External Observation:   · Voluntary Contraction: present  · Voluntary Relaxation: delayed  · Involuntary Contraction: present  · Involuntary Relaxation: delayed  · Perineal Body Assessment: WNL  · Reflex: WNL  · Anal [place]: Weak  · Skin Integrity: WNL  · Q-tip Test: pain at 1:00, 5:00, 6:00, 7:00 and 11:00  · Vaginal Vault Size: within normal limits  ·   · Palpation:   Right Left   Bulbocavernosus     Ischocavernosus     Superficial Transverse Perineal tenderness tenderness   Sphincter Urethrae Tenderness 4/10 tenderness 2/10   Compressor Urethra  Tenderness 4/10    Urethra-vaginalis     Deep Transverse Perineium Tenderness 4/10    Obturator Internus     Iliococcygeus     Coccygeus     Pubococcygeus     Levator Ani  Tenderness 2/10   Adductor     Psoas           Body Structures Involved:  1. Muscles  2. vestibule of vagina Body Functions Affected:  1. Genitourinary  2. Reproductive  3. Neuromusculoskeletal Activities and Participation Affected:  1. Interpersonal Interactions and Relationships   Number of elements that affect the Plan of Care: 3: MODERATE COMPLEXITY   CLINICAL PRESENTATION:   Presentation: Evolving clinical presentation with changing clinical characteristics: MODERATE COMPLEXITY   CLINICAL DECISION MAKING:   Outcome Measure: Tool Used: Pelvic Floor Disability Index (PFDI-20)  Score:  Initial: 8/10 Most Recent: X (Date: -- )   Interpretation of Score:   This survey asks questions concerning certain  bowel, bladder, or pelvic symptoms and how much this symptoms interfere with daily activiites. Each section is scored on a 0-4 scale, 4 representing the greatest disability. The scores of each section are added together for a total score out of 70. Score 0 1-13 14-27 28-41 42-55 56-69 70   Modifier CH CI CJ CK CL CM CN     Medical Necessity:   · Patient is expected to demonstrate progress in range of motion and coordination to increase independence with intercourse. · Patient demonstrates good rehab potential due to higher previous functional level. Reason for Services/Other Comments:  · Patient continues to require skilled intervention due to increase tenderness at vestibule and poor coordination of pelvic floor muscles contributing to painful intercoures. .   Use of outcome tool(s) and clinical judgement create a POC that gives a: Questionable prediction of patient's progress: MODERATE COMPLEXITY   TREATMENT:   (In addition to Assessment/Re-Assessment sessions the following treatments were rendered)  Pre-treatment Symptoms/Complaints: Pt states that she only was sore for a few hours last session. Has not tried intercourse at this time. Pain: Initial:   Pain Intensity 1: 0 0/10 Post Session:  0/10     THERAPEUTIC EXERCISE: (8 minutes):  Exercises per grid below to improve mobility and coordination. Required moderate verbal and tactile cues to promote proper body breathing techniques. Progressed repetitions and complexity of movement as indicated. MANUAL THERAPY: (40 minutes): Soft tissue mobilization was utilized and necessary because of the patient's restricted motion of soft tissue. SCS and Trigger point to bilateral LA, OI, and perineum to reduce tone and improve tissue elasticity B. Soft tissue to bilateral adductors  Low light laser for 15 sec x 2 at perineum level 3. Dilators Amin size 4 and 5, and 6  no pain noted 4, 5.  Dilator 4 had pain at 1/10    Exercises:  Patient instructed in pelvic floor exercises listed below:   Date:  3/22/2017 Date:  4/7/2017 Activity/Exercise     Pt education   5 min    Adduction stretch 3 x 30 sec 3 x 30 sec   Perineum stretch 3 x 30 sec 3 x 30 sec   Piriformis stretch     Hamstring 3 x 30 sec    Prone heel squeezes x5 x 10 sec    Prone hip extension X 10 each        The following educational topics were reviewed with patient:  Bridgette Calhoun. Soul source and [facility] dilator information given. · Treatment/Session Assessment: Pt did well with therapy. No soreness after therapy. · Response to Treatment:  Excellent   · Compliance with Program/Exercises: Will assess as treatment progresses. · Recommendations/Intent for next treatment session: \"Next visit will focus on advancements to more challenging activities\".   Total Treatment Duration:  PT Patient Time In/Time Out  Time In: 0730  Time Out: 0830    Nat Richard PT

## 2017-04-11 ENCOUNTER — APPOINTMENT (OUTPATIENT)
Dept: PHYSICAL THERAPY | Age: 32
End: 2017-04-11
Payer: COMMERCIAL

## 2017-04-21 ENCOUNTER — HOSPITAL ENCOUNTER (OUTPATIENT)
Dept: PHYSICAL THERAPY | Age: 32
Discharge: HOME | End: 2017-04-21
Payer: COMMERCIAL

## 2017-04-21 PROCEDURE — 97140 MANUAL THERAPY 1/> REGIONS: CPT

## 2017-04-21 NOTE — PROGRESS NOTES
Tasneem Sinclair  : 1985 [facility] [place] [address] [facility] [address]  [phone]   [phone]          OUTPATIENT PHYSICAL THERAPY:Daily Note 2017    ICD-10: Treatment Diagnosis: Myalgia (M79.1)  Precautions/Allergies:   Penicillins   Fall Risk Score: 0 (? 5 = High Risk)  MD Orders: Eval and treat MEDICAL/REFERRING DIAGNOSIS:  Myalgia [M79.1]   DATE OF ONSET:10 + years  REFERRING PHYSICIAN: Yenny Nieto MD  RETURN PHYSICIAN APPOINTMENT: 1 year     INITIAL ASSESSMENT:  Ms. Madi Stewart has been seen for 8 physical therapy visits. She has good improvements in the 3rd layer of the pelvic floor with reports of tenderness and decreased tone. Pt is now able to tolerate insertion of 5 Amin dilator. She has met all of short term goals and is working towards her long term goals. She does continue to have increased tenderness in superficial and deep perirenal. She continues to benefit from skilled PT to address remaining deficits and meet long term goals. PROBLEM LIST (Impacting functional limitations):  1. Decreased ADL/Functional Activities  2. Increased Pain  3. Decreased Flexibility/Joint Mobility INTERVENTIONS PLANNED:  1. Neuromuscular re-education  2. Biofeedback as needed  3. HEP  4. Electrical Stimulation  5. Manual Therapy  6. Range of Motion (ROM)  7. Therapeutic Activites  8. Therapeutic Exercise/Strengthening   TREATMENT PLAN:  Effective Dates: 2017 TO 2017. Frequency/Duration: 1 time a week for 12 weeks  GOALS: (Goals have been discussed and agreed upon with patient.)  Short-Term Functional Goals: Time Frame: in 3 weeks  1. Patient will demonstrate independence with home exercise program. (met)  2. Pt will demonstrate good relaxation of pelvic floor mm upon command. (met)  Discharge Goals: Time Frame: in 12 weeks  1. Pt will be able to tolerate insertion of large dilator.    2. Pt will report no pain with intercourse during insertion or friction. Rehabilitation Potential For Stated Goals: Good  Regarding Paresh Steele therapy, I certify that the treatment plan above will be carried out by a therapist or under their direction. Thank you for this referral,  Khoa Chatterjee, PT     Referring Physician Signature: Yenny Nieto MD              Date                    The information in this section was collected on 2/13/2017 (except where otherwise noted). HISTORY:   Present Symptoms:  Pain Intensity 1: 0 Pain with intercourse. Pain is sharp. Pain is primarily at perineum. No hip or back problems. Pt reports that she is sore the next day in the perineum. History of Present Injury/Illness (Reason for Referral):   Has always had pain with intercourse. Had second child on August 25, 2017. Has not tried intercourse since then. Sister did have ovarian CA. Past Medical History/Comorbidities:   Ms. Madi Stewart  has a past medical history of Anemia; Ectopic pregnancy (2015); Epilepsy ([facility] [address] and Infertility, female. Ms. Madi Stewart  has a past surgical history that includes salpingo-oophorectomy; other surgical (April 2015); and other surgical (12/2002). Social History/Living Environment:    Lives with  and 2 children  Prior Level of Function/Work/Activity:  Mostly sitting at work. Personal Factors:          Past/Current Experience:  Has had pain with intercourse and pain with first tampon insertion. Does not have a left ovary. Current Medications:    Current Outpatient Prescriptions:     ferrous sulfate (IRON) 325 mg (65 mg iron) tablet, Take  by mouth Daily (before breakfast). , Disp: , Rfl:    Date Last Reviewed:  4/21/2017  Gynecological History:   · Number of pregnancies: 3 pregnancies  , vaginal 2,  · Episiotomy: did tear that was repaired   Past Urinary Medical History:    · History of UTI, Menopause: none significant  · Previous Treatments: has talked to an OB previously.   Tried a cream that didn't do anything. Voiding Patterns:  Patient voids every 2-4 hours during the day and 0-1 times during the night. Patient reports that she empties bladder. Bowel Habits:  Patient demonstrates normal bowel habits. Mobility / Self Care: I  Personal / Social History:  · Sexually active? YES: pain is at entrance   Number of Personal Factors/Comorbidities that affect the Plan of Care: 1-2: MODERATE COMPLEXITY   EXAMINATION:   External Observation:   · Voluntary Contraction: present  · Voluntary Relaxation: delayed  · Involuntary Contraction: present  · Involuntary Relaxation: delayed  · Perineal Body Assessment: WNL  · Reflex: WNL  · Anal [place]: Weak  · Skin Integrity: WNL  · Q-tip Test: pain at 1:00, 5:00, 6:00, 7:00 and 11:00  · Vaginal Vault Size: within normal limits  ·   · Palpation:   Right Left   Bulbocavernosus     Ischocavernosus     Superficial Transverse Perineal tenderness tenderness   Sphincter Urethrae Tenderness 4/10 tenderness 2/10   Compressor Urethra  Tenderness 4/10    Urethra-vaginalis     Deep Transverse Perineium Tenderness 4/10    Obturator Internus     Iliococcygeus     Coccygeus     Pubococcygeus     Levator Ani  Tenderness 2/10   Adductor     Psoas           Body Structures Involved:  1. Muscles  2. vestibule of vagina Body Functions Affected:  1. Genitourinary  2. Reproductive  3. Neuromusculoskeletal Activities and Participation Affected:  1. Interpersonal Interactions and Relationships   Number of elements that affect the Plan of Care: 3: MODERATE COMPLEXITY   CLINICAL PRESENTATION:   Presentation: Evolving clinical presentation with changing clinical characteristics: MODERATE COMPLEXITY   CLINICAL DECISION MAKING:   Outcome Measure: Tool Used: Pelvic Floor Disability Index (PFDI-20)  Score:  Initial: 8/10 Most Recent: X (Date: -- )   Interpretation of Score:   This survey asks questions concerning certain  bowel, bladder, or pelvic symptoms and how much this symptoms interfere with daily activiites. Each section is scored on a 0-4 scale, 4 representing the greatest disability. The scores of each section are added together for a total score out of 70. Score 0 1-13 14-27 28-41 42-55 56-69 70   Modifier CH CI CJ CK CL CM CN     Medical Necessity:   · Patient is expected to demonstrate progress in range of motion and coordination to increase independence with intercourse. · Patient demonstrates good rehab potential due to higher previous functional level. Reason for Services/Other Comments:  · Patient continues to require skilled intervention due to increase tenderness at vestibule and poor coordination of pelvic floor muscles contributing to painful intercoures. .   Use of outcome tool(s) and clinical judgement create a POC that gives a: Questionable prediction of patient's progress: MODERATE COMPLEXITY   TREATMENT:   (In addition to Assessment/Re-Assessment sessions the following treatments were rendered)  Pre-treatment Symptoms/Complaints: no issues with pelvic floor over the last week. She had intercourse without lidocaine it it went well. Pain: Initial:   Pain Intensity 1: 0 0/10 Post Session:  0/10     THERAPEUTIC EXERCISE: (0 minutes):  Exercises per grid below to improve mobility and coordination. Required moderate verbal and tactile cues to promote proper body breathing techniques. Progressed repetitions and complexity of movement as indicated. MANUAL THERAPY: (40 minutes): Soft tissue mobilization was utilized and necessary because of the patient's restricted motion of soft tissue. SCS and Trigger point to bilateral LA, OI, and perineum to reduce tone and improve tissue elasticity B. Soft tissue to bilateral adductors  Low light laser for 15 sec x 2 at perineum level 3.     Dilators Amin size 5, and 6   Dilator 6 had pain at 1/10    Exercises:  Patient instructed in pelvic floor exercises listed below:   Date:  3/22/2017 Date:  4/7/2017 Date:  4/21/2017 Activity/Exercise      Pt education   5 min     Adduction stretch 3 x 30 sec 3 x 30 sec HEP   Perineum stretch 3 x 30 sec 3 x 30 sec HEP   Piriformis stretch      Hamstring 3 x 30 sec  HEP   Prone heel squeezes x5 x 10 sec  HEP   Prone hip extension X 10 each         The following educational topics were reviewed with patient:  Vesta López. Soul source and [facility] dilator information given. · Treatment/Session Assessment: Pt did well with therapy. No soreness after therapy. Was able to tolerated XL dilator. · Response to Treatment:  Excellent   · Compliance with Program/Exercises: Will assess as treatment progresses. · Recommendations/Intent for next treatment session: \"Next visit will focus on advancements to more challenging activities\".   Total Treatment Duration:  PT Patient Time In/Time Out  Time In: 0730  Time Out: 0830    Viki Dubin, PT

## 2017-04-28 ENCOUNTER — HOSPITAL ENCOUNTER (OUTPATIENT)
Dept: PHYSICAL THERAPY | Age: 32
Discharge: HOME | End: 2017-04-28
Payer: COMMERCIAL

## 2017-04-28 PROCEDURE — 97140 MANUAL THERAPY 1/> REGIONS: CPT

## 2017-04-28 NOTE — PROGRESS NOTES
Leartis Alpers  : 1985 [facility] [place] [address],[address] [facility]  [phone]   [phone]          OUTPATIENT PHYSICAL THERAPY:Daily Note 2017    ICD-10: Treatment Diagnosis: Myalgia (M79.1)  Precautions/Allergies:   Penicillins   Fall Risk Score: 0 (? 5 = High Risk)  MD Orders: Eval and treat MEDICAL/REFERRING DIAGNOSIS:  Myalgia [M79.1]   DATE OF ONSET:10 + years  REFERRING PHYSICIAN: Nalini Cifuentes MD  RETURN PHYSICIAN APPOINTMENT: 1 year     INITIAL ASSESSMENT:  Ms. Howard Boles has been seen for 8 physical therapy visits. She has good improvements in the 3rd layer of the pelvic floor with reports of tenderness and decreased tone. Pt is now able to tolerate insertion of 5 Amin dilator. She has met all of short term goals and is working towards her long term goals. She does continue to have increased tenderness in superficial and deep perirenal. She continues to benefit from skilled PT to address remaining deficits and meet long term goals. PROBLEM LIST (Impacting functional limitations):  1. Decreased ADL/Functional Activities  2. Increased Pain  3. Decreased Flexibility/Joint Mobility INTERVENTIONS PLANNED:  1. Neuromuscular re-education  2. Biofeedback as needed  3. HEP  4. Electrical Stimulation  5. Manual Therapy  6. Range of Motion (ROM)  7. Therapeutic Activites  8. Therapeutic Exercise/Strengthening   TREATMENT PLAN:  Effective Dates: 2017 TO 2017. Frequency/Duration: 1 time a week for 12 weeks  GOALS: (Goals have been discussed and agreed upon with patient.)  Short-Term Functional Goals: Time Frame: in 3 weeks  1. Patient will demonstrate independence with home exercise program. (met)  2. Pt will demonstrate good relaxation of pelvic floor mm upon command. (met)  Discharge Goals: Time Frame: in 12 weeks  1. Pt will be able to tolerate insertion of large dilator.    2. Pt will report no pain with intercourse during insertion or friction. Rehabilitation Potential For Stated Goals: Good  Regarding Melven Climes therapy, I certify that the treatment plan above will be carried out by a therapist or under their direction. Thank you for this referral,  Bre Geronimo PT     Referring Physician Signature: Jazz Hedrick MD              Date                    The information in this section was collected on 2/13/2017 (except where otherwise noted). HISTORY:   Present Symptoms:  Pain Intensity 1: 0 Pain with intercourse. Pain is sharp. Pain is primarily at perineum. No hip or back problems. Pt reports that she is sore the next day in the perineum. History of Present Injury/Illness (Reason for Referral):   Has always had pain with intercourse. Had second child on August 25, 2017. Has not tried intercourse since then. Sister did have ovarian CA. Past Medical History/Comorbidities:   Ms. Gavi Long  has a past medical history of Anemia; Ectopic pregnancy (2015); Epilepsy ([facility] [address] and Infertility, female. Ms. Gavi Long  has a past surgical history that includes salpingo-oophorectomy; other surgical (April 2015); and other surgical (12/2002). Social History/Living Environment:    Lives with  and 2 children  Prior Level of Function/Work/Activity:  Mostly sitting at work. Personal Factors:          Past/Current Experience:  Has had pain with intercourse and pain with first tampon insertion. Does not have a left ovary. Current Medications:    Current Outpatient Prescriptions:     ferrous sulfate (IRON) 325 mg (65 mg iron) tablet, Take  by mouth Daily (before breakfast). , Disp: , Rfl:    Date Last Reviewed:  4/28/2017  Gynecological History:   · Number of pregnancies: 3 pregnancies  , vaginal 2,  · Episiotomy: did tear that was repaired   Past Urinary Medical History:    · History of UTI, Menopause: none significant  · Previous Treatments: has talked to an OB previously.   Tried a cream that didn't do anything. Voiding Patterns:  Patient voids every 2-4 hours during the day and 0-1 times during the night. Patient reports that she empties bladder. Bowel Habits:  Patient demonstrates normal bowel habits. Mobility / Self Care: I  Personal / Social History:  · Sexually active? YES: pain is at entrance   Number of Personal Factors/Comorbidities that affect the Plan of Care: 1-2: MODERATE COMPLEXITY   EXAMINATION:   External Observation:   · Voluntary Contraction: present  · Voluntary Relaxation: delayed  · Involuntary Contraction: present  · Involuntary Relaxation: delayed  · Perineal Body Assessment: WNL  · Reflex: WNL  · Anal [place]: Weak  · Skin Integrity: WNL  · Q-tip Test: pain at 1:00, 5:00, 6:00, 7:00 and 11:00  · Vaginal Vault Size: within normal limits  ·   · Palpation:   Right Left   Bulbocavernosus     Ischocavernosus     Superficial Transverse Perineal tenderness tenderness   Sphincter Urethrae Tenderness 4/10 tenderness 2/10   Compressor Urethra  Tenderness 4/10    Urethra-vaginalis     Deep Transverse Perineium Tenderness 4/10    Obturator Internus     Iliococcygeus     Coccygeus     Pubococcygeus     Levator Ani  Tenderness 2/10   Adductor     Psoas           Body Structures Involved:  1. Muscles  2. vestibule of vagina Body Functions Affected:  1. Genitourinary  2. Reproductive  3. Neuromusculoskeletal Activities and Participation Affected:  1. Interpersonal Interactions and Relationships   Number of elements that affect the Plan of Care: 3: MODERATE COMPLEXITY   CLINICAL PRESENTATION:   Presentation: Evolving clinical presentation with changing clinical characteristics: MODERATE COMPLEXITY   CLINICAL DECISION MAKING:   Outcome Measure: Tool Used: Pelvic Floor Disability Index (PFDI-20)  Score:  Initial: 8/10 Most Recent: X (Date: -- )   Interpretation of Score:   This survey asks questions concerning certain  bowel, bladder, or pelvic symptoms and how much this symptoms interfere with daily activiites. Each section is scored on a 0-4 scale, 4 representing the greatest disability. The scores of each section are added together for a total score out of 70. Score 0 1-13 14-27 28-41 42-55 56-69 70   Modifier CH CI CJ CK CL CM CN     Medical Necessity:   · Patient is expected to demonstrate progress in range of motion and coordination to increase independence with intercourse. · Patient demonstrates good rehab potential due to higher previous functional level. Reason for Services/Other Comments:  · Patient continues to require skilled intervention due to increase tenderness at vestibule and poor coordination of pelvic floor muscles contributing to painful intercoures. .   Use of outcome tool(s) and clinical judgement create a POC that gives a: Questionable prediction of patient's progress: MODERATE COMPLEXITY   TREATMENT:   (In addition to Assessment/Re-Assessment sessions the following treatments were rendered)  Pre-treatment Symptoms/Complaints: Pt did have intercourse this week and she started with 3/10 that dissipated within one minute. She reports that it was a different position than last week. Pain: Initial:   Pain Intensity 1: 0 0/10 Post Session:  0/10     THERAPEUTIC EXERCISE: (3 minutes):  Exercises per grid below to improve mobility and coordination. Required moderate verbal and tactile cues to promote proper body breathing techniques. Progressed repetitions and complexity of movement as indicated. MANUAL THERAPY: (40 minutes): Soft tissue mobilization was utilized and necessary because of the patient's restricted motion of soft tissue. SCS and Trigger point to bilateral LA, OI, and perineum to reduce tone and improve tissue elasticity B. Soft tissue to bilateral adductors  Low light laser for 15 sec x 2 at perineum level 3. Dilators Amin size 5, and 6  No pain noted with dilators today.      Exercises:  Patient instructed in pelvic floor exercises listed below:   Date:  3/22/2017 Date:  4/7/2017 Date:  4/21/2017 Date:  4/28/2017   Activity/Exercise       Pt education   5 min      Adduction stretch 3 x 30 sec 3 x 30 sec HEP 3 x 30 sec B   Perineum stretch 3 x 30 sec 3 x 30 sec HEP Left 3 x 30 sec    Piriformis stretch       Hamstring 3 x 30 sec  HEP    Prone heel squeezes x5 x 10 sec  HEP    Prone hip extension X 10 each          The following educational topics were reviewed with patient:  Juan Johnson. [facility] source and [facility] dilator information given. · Treatment/Session Assessment: Pt did well with therapy. Did have some slight pain on left levator ani. No soreness after therapy. Was able to tolerated XL dilator. · Response to Treatment:  Excellent   · Compliance with Program/Exercises: Will assess as treatment progresses. · Recommendations/Intent for next treatment session: \"Next visit will focus on advancements to more challenging activities\".   Total Treatment Duration:  PT Patient Time In/Time Out  Time In: 0730  Time Out: 0830    Duyen Rai PT

## 2017-05-04 ENCOUNTER — HOSPITAL ENCOUNTER (OUTPATIENT)
Dept: PHYSICAL THERAPY | Age: 32
Discharge: HOME | End: 2017-05-04
Payer: COMMERCIAL

## 2017-05-09 ENCOUNTER — HOSPITAL ENCOUNTER (OUTPATIENT)
Dept: PHYSICAL THERAPY | Age: 32
Discharge: HOME | End: 2017-05-09
Payer: COMMERCIAL

## 2017-05-09 PROCEDURE — 97140 MANUAL THERAPY 1/> REGIONS: CPT

## 2017-05-09 PROCEDURE — 97110 THERAPEUTIC EXERCISES: CPT

## 2017-05-09 NOTE — PROGRESS NOTES
Tasneem Sinclair  : 1985 [facility]  [address], [address] [place] [address],[address]  [phone]   [phone]          OUTPATIENT PHYSICAL THERAPY:Daily Note 2017    ICD-10: Treatment Diagnosis: Myalgia (M79.1)  Precautions/Allergies:   Penicillins   Fall Risk Score: 0 (? 5 = High Risk)  MD Orders: Eval and treat MEDICAL/REFERRING DIAGNOSIS:  Myalgia [M79.1]   DATE OF ONSET:10 + years  REFERRING PHYSICIAN: Yenny Nieto MD  RETURN PHYSICIAN APPOINTMENT: 1 year     INITIAL ASSESSMENT:  Ms. Madi Stewart has been seen for 8 physical therapy visits. She has good improvements in the 3rd layer of the pelvic floor with reports of tenderness and decreased tone. Pt is now able to tolerate insertion of 5 Amin dilator. She has met all of short term goals and is working towards her long term goals. She does continue to have increased tenderness in superficial and deep perirenal. She continues to benefit from skilled PT to address remaining deficits and meet long term goals. PROBLEM LIST (Impacting functional limitations):  1. Decreased ADL/Functional Activities  2. Increased Pain  3. Decreased Flexibility/Joint Mobility INTERVENTIONS PLANNED:  1. Neuromuscular re-education  2. Biofeedback as needed  3. HEP  4. Electrical Stimulation  5. Manual Therapy  6. Range of Motion (ROM)  7. Therapeutic Activites  8. Therapeutic Exercise/Strengthening   TREATMENT PLAN:  Effective Dates: 2017 TO 2017. Frequency/Duration: 1 time a week for 12 weeks  GOALS: (Goals have been discussed and agreed upon with patient.)  Short-Term Functional Goals: Time Frame: in 3 weeks  1. Patient will demonstrate independence with home exercise program. (met)  2. Pt will demonstrate good relaxation of pelvic floor mm upon command. (met)  Discharge Goals: Time Frame: in 12 weeks  1. Pt will be able to tolerate insertion of large dilator.    2. Pt will report no pain with intercourse during insertion or friction. Rehabilitation Potential For Stated Goals: Good  Regarding Paresh Steele therapy, I certify that the treatment plan above will be carried out by a therapist or under their direction. Thank you for this referral,  Khoa Chatterjee, PT     Referring Physician Signature: Yenny Nieto MD              Date                    The information in this section was collected on 2/13/2017 (except where otherwise noted). HISTORY:   Present Symptoms:  Pain Intensity 1: 0 Pain with intercourse. Pain is sharp. Pain is primarily at perineum. No hip or back problems. Pt reports that she is sore the next day in the perineum. History of Present Injury/Illness (Reason for Referral):   Has always had pain with intercourse. Had second child on August 25, 2017. Has not tried intercourse since then. Sister did have ovarian CA. Past Medical History/Comorbidities:   Ms. Madi Stewart  has a past medical history of Anemia; Ectopic pregnancy (2015); Epilepsy ([facility] [address] and Infertility, female. Ms. Madi Stewart  has a past surgical history that includes salpingo-oophorectomy; other surgical (April 2015); and other surgical (12/2002). Social History/Living Environment:    Lives with  and 2 children  Prior Level of Function/Work/Activity:  Mostly sitting at work. Personal Factors:          Past/Current Experience:  Has had pain with intercourse and pain with first tampon insertion. Does not have a left ovary. Current Medications:    Current Outpatient Prescriptions:     ferrous sulfate (IRON) 325 mg (65 mg iron) tablet, Take  by mouth Daily (before breakfast). , Disp: , Rfl:    Date Last Reviewed:  5/9/2017  Gynecological History:   · Number of pregnancies: 3 pregnancies  , vaginal 2,  · Episiotomy: did tear that was repaired   Past Urinary Medical History:    · History of UTI, Menopause: none significant  · Previous Treatments: has talked to an OB previously.   Tried a cream that didn't do anything. Voiding Patterns:  Patient voids every 2-4 hours during the day and 0-1 times during the night. Patient reports that she empties bladder. Bowel Habits:  Patient demonstrates normal bowel habits. Mobility / Self Care: I  Personal / Social History:  · Sexually active? YES: pain is at entrance   Number of Personal Factors/Comorbidities that affect the Plan of Care: 1-2: MODERATE COMPLEXITY   EXAMINATION:   External Observation:   · Voluntary Contraction: present  · Voluntary Relaxation: delayed  · Involuntary Contraction: present  · Involuntary Relaxation: delayed  · Perineal Body Assessment: WNL  · Reflex: WNL  · Anal [place]: Weak  · Skin Integrity: WNL  · Q-tip Test: pain at 1:00, 5:00, 6:00, 7:00 and 11:00  · Vaginal Vault Size: within normal limits  ·   · Palpation:   Right Left   Bulbocavernosus     Ischocavernosus     Superficial Transverse Perineal tenderness tenderness   Sphincter Urethrae Tenderness 4/10 tenderness 2/10   Compressor Urethra  Tenderness 4/10    Urethra-vaginalis     Deep Transverse Perineium Tenderness 4/10    Obturator Internus     Iliococcygeus     Coccygeus     Pubococcygeus     Levator Ani  Tenderness 2/10   Adductor     Psoas           Body Structures Involved:  1. Muscles  2. vestibule of vagina Body Functions Affected:  1. Genitourinary  2. Reproductive  3. Neuromusculoskeletal Activities and Participation Affected:  1. Interpersonal Interactions and Relationships   Number of elements that affect the Plan of Care: 3: MODERATE COMPLEXITY   CLINICAL PRESENTATION:   Presentation: Evolving clinical presentation with changing clinical characteristics: MODERATE COMPLEXITY   CLINICAL DECISION MAKING:   Outcome Measure: Tool Used: Pelvic Floor Disability Index (PFDI-20)  Score:  Initial: 8/10 Most Recent: X (Date: -- )   Interpretation of Score:   This survey asks questions concerning certain  bowel, bladder, or pelvic symptoms and how much this symptoms interfere with daily activiites. Each section is scored on a 0-4 scale, 4 representing the greatest disability. The scores of each section are added together for a total score out of 70. Score 0 1-13 14-27 28-41 42-55 56-69 70   Modifier CH CI CJ CK CL CM CN     Medical Necessity:   · Patient is expected to demonstrate progress in range of motion and coordination to increase independence with intercourse. · Patient demonstrates good rehab potential due to higher previous functional level. Reason for Services/Other Comments:  · Patient continues to require skilled intervention due to increase tenderness at vestibule and poor coordination of pelvic floor muscles contributing to painful intercoures. .   Use of outcome tool(s) and clinical judgement create a POC that gives a: Questionable prediction of patient's progress: MODERATE COMPLEXITY   TREATMENT:   (In addition to Assessment/Re-Assessment sessions the following treatments were rendered)  Pre-treatment Symptoms/Complaints:   Pain: Initial:   Pain Intensity 1: 0 0/10 Post Session:  0/10     THERAPEUTIC EXERCISE: (8 minutes):  Exercises per grid below to improve mobility and coordination. Required moderate verbal and tactile cues to promote proper body breathing techniques. Progressed repetitions and complexity of movement as indicated. MANUAL THERAPY: (40 minutes): Soft tissue mobilization was utilized and necessary because of the patient's restricted motion of soft tissue. SCS and Trigger point to bilateral LA, OI, and perineum to reduce tone and improve tissue elasticity B. Soft tissue to bilateral adductors  Low light laser for 15 sec x 2 at perineum level 3. (not performed)   Dilators Amin size 5, and 6  No pain noted with dilators today.      Exercises:  Patient instructed in pelvic floor exercises listed below:   Date:  4/21/2017 Date:  4/28/2017 DateL  5/8/2017   Activity/Exercise      Pt education       Adduction stretch HEP 3 x 30 sec B 3 x 30 sec Perineum stretch HEP Left 3 x 30 sec  3 x 30 sec   Piriformis stretch   3 x 30 sec   Hamstring HEP     Prone heel squeezes HEP     Prone hip extension      Pelvic floor drops   X 10             The following educational topics were reviewed with patient:  · Treatment/Session Assessment: Pt continues to make excellent progress. Was able to tolerate XL dilator without discomfort. · Response to Treatment:  Excellent   · Compliance with Program/Exercises: Will assess as treatment progresses. · Recommendations/Intent for next treatment session: \"Next visit will focus on advancements to more challenging activities\".   Total Treatment Duration:  PT Patient Time In/Time Out  Time In: 1230  Time Out: 1330    She Truong PT

## 2017-05-15 ENCOUNTER — HOSPITAL ENCOUNTER (OUTPATIENT)
Dept: PHYSICAL THERAPY | Age: 32
Discharge: HOME | End: 2017-05-15
Payer: COMMERCIAL

## 2017-05-15 PROCEDURE — 97140 MANUAL THERAPY 1/> REGIONS: CPT

## 2017-05-15 NOTE — PROGRESS NOTES
Jefry Roque  : 1985 [facility] at [address], [address] [place] [address],[address], [address]  [phone]   [phone]          OUTPATIENT PHYSICAL THERAPY:Daily Note 5/15/2017    ICD-10: Treatment Diagnosis: Myalgia (M79.1)  Precautions/Allergies:   Penicillins   Fall Risk Score: 0 (? 5 = High Risk)  MD Orders: Eval and treat MEDICAL/REFERRING DIAGNOSIS:  Myalgia [M79.1]   DATE OF ONSET:10 + years  REFERRING PHYSICIAN: Jessica Bergeron MD  RETURN PHYSICIAN APPOINTMENT: 1 year     INITIAL ASSESSMENT:  Ms. Kiara Jasso has been seen for 8 physical therapy visits. She has good improvements in the 3rd layer of the pelvic floor with reports of tenderness and decreased tone. Pt is now able to tolerate insertion of 5 Amin dilator. She has met all of short term goals and is working towards her long term goals. She does continue to have increased tenderness in superficial and deep perirenal. She continues to benefit from skilled PT to address remaining deficits and meet long term goals. PROBLEM LIST (Impacting functional limitations):  1. Decreased ADL/Functional Activities  2. Increased Pain  3. Decreased Flexibility/Joint Mobility INTERVENTIONS PLANNED:  1. Neuromuscular re-education  2. Biofeedback as needed  3. HEP  4. Electrical Stimulation  5. Manual Therapy  6. Range of Motion (ROM)  7. Therapeutic Activites  8. Therapeutic Exercise/Strengthening   TREATMENT PLAN:  Effective Dates: 2017 TO 2017. Frequency/Duration: 1 time a week for 12 weeks  GOALS: (Goals have been discussed and agreed upon with patient.)  Short-Term Functional Goals: Time Frame: in 3 weeks  1. Patient will demonstrate independence with home exercise program. (met)  2. Pt will demonstrate good relaxation of pelvic floor mm upon command. (met)  Discharge Goals: Time Frame: in 12 weeks  1. Pt will be able to tolerate insertion of large dilator.    2. Pt will report no pain with intercourse during insertion or friction. Rehabilitation Potential For Stated Goals: Good  Regarding Lauren Barbosa therapy, I certify that the treatment plan above will be carried out by a therapist or under their direction. Thank you for this referral,  Pastor Dick PT     Referring Physician Signature: Jessica Bergeron MD              Date                    The information in this section was collected on 2/13/2017 (except where otherwise noted). HISTORY:   Present Symptoms:  Pain Intensity 1: 0 Pain with intercourse. Pain is sharp. Pain is primarily at perineum. No hip or back problems. Pt reports that she is sore the next day in the perineum. History of Present Injury/Illness (Reason for Referral):   Has always had pain with intercourse. Had second child on August 25, 2017. Has not tried intercourse since then. Sister did have ovarian CA. Past Medical History/Comorbidities:   Ms. Kiara Jasso  has a past medical history of Anemia; Ectopic pregnancy (2015); Epilepsy ([facility] [address] and Infertility, female. Ms. Kiara Jasso  has a past surgical history that includes salpingo-oophorectomy; other surgical (April 2015); and other surgical (12/2002). Social History/Living Environment:    Lives with  and 2 children  Prior Level of Function/Work/Activity:  Mostly sitting at work. Personal Factors:          Past/Current Experience:  Has had pain with intercourse and pain with first tampon insertion. Does not have a left ovary. Current Medications:    Current Outpatient Prescriptions:     ferrous sulfate (IRON) 325 mg (65 mg iron) tablet, Take  by mouth Daily (before breakfast). , Disp: , Rfl:    Date Last Reviewed:  5/15/2017  Gynecological History:   · Number of pregnancies: 3 pregnancies  , vaginal 2,  · Episiotomy: did tear that was repaired   Past Urinary Medical History:    · History of UTI, Menopause: none significant  · Previous Treatments: has talked to an OB previously.   Tried a cream that didn't do anything. Voiding Patterns:  Patient voids every 2-4 hours during the day and 0-1 times during the night. Patient reports that she empties bladder. Bowel Habits:  Patient demonstrates normal bowel habits. Mobility / Self Care: I  Personal / Social History:  · Sexually active? YES: pain is at entrance   Number of Personal Factors/Comorbidities that affect the Plan of Care: 1-2: MODERATE COMPLEXITY   EXAMINATION:   External Observation:   · Voluntary Contraction: present  · Voluntary Relaxation: delayed  · Involuntary Contraction: present  · Involuntary Relaxation: delayed  · Perineal Body Assessment: WNL  · Reflex: WNL  · Anal [place]: Weak  · Skin Integrity: WNL  · Q-tip Test: pain at 1:00, 5:00, 6:00, 7:00 and 11:00  · Vaginal Vault Size: within normal limits  ·   · Palpation:   Right Left   Bulbocavernosus     Ischocavernosus     Superficial Transverse Perineal tenderness tenderness   Sphincter Urethrae Tenderness 4/10 tenderness 2/10   Compressor Urethra  Tenderness 4/10    Urethra-vaginalis     Deep Transverse Perineium Tenderness 4/10    Obturator Internus     Iliococcygeus     Coccygeus     Pubococcygeus     Levator Ani  Tenderness 2/10   Adductor     Psoas           Body Structures Involved:  1. Muscles  2. vestibule of vagina Body Functions Affected:  1. Genitourinary  2. Reproductive  3. Neuromusculoskeletal Activities and Participation Affected:  1. Interpersonal Interactions and Relationships   Number of elements that affect the Plan of Care: 3: MODERATE COMPLEXITY   CLINICAL PRESENTATION:   Presentation: Evolving clinical presentation with changing clinical characteristics: MODERATE COMPLEXITY   CLINICAL DECISION MAKING:   Outcome Measure: Tool Used: Pelvic Floor Disability Index (PFDI-20)  Score:  Initial: 8/10 Most Recent: X (Date: -- )   Interpretation of Score:   This survey asks questions concerning certain  bowel, bladder, or pelvic symptoms and how much this symptoms interfere with daily activiites. Each section is scored on a 0-4 scale, 4 representing the greatest disability. The scores of each section are added together for a total score out of 70. Score 0 1-13 14-27 28-41 42-55 56-69 70   Modifier CH CI CJ CK CL CM CN     Medical Necessity:   · Patient is expected to demonstrate progress in range of motion and coordination to increase independence with intercourse. · Patient demonstrates good rehab potential due to higher previous functional level. Reason for Services/Other Comments:  · Patient continues to require skilled intervention due to increase tenderness at vestibule and poor coordination of pelvic floor muscles contributing to painful intercoures. .   Use of outcome tool(s) and clinical judgement create a POC that gives a: Questionable prediction of patient's progress: MODERATE COMPLEXITY   TREATMENT:   (In addition to Assessment/Re-Assessment sessions the following treatments were rendered)  Pre-treatment Symptoms/Complaints:   Pain: Initial:   Pain Intensity 1: 0 0/10 Post Session:  0/10     THERAPEUTIC EXERCISE: (0 minutes):  Exercises per grid below to improve mobility and coordination. Required moderate verbal and tactile cues to promote proper body breathing techniques. Progressed repetitions and complexity of movement as indicated. MANUAL THERAPY: (40 minutes): Soft tissue mobilization was utilized and necessary because of the patient's restricted motion of soft tissue. SCS and Trigger point to bilateral LA, OI, and perineum to reduce tone and improve tissue elasticity B. Soft tissue to bilateral adductors  Low light laser for 15 sec x 2 at perineum level 3. (not performed)   Dilators Amin size 5, and 6  Slight pain with dilator 6 at 1/10 that resolved quickly.      Exercises:  Patient instructed in pelvic floor exercises listed below:   Date:  4/21/2017 Date:  4/28/2017 DateL  5/8/2017   Activity/Exercise      Pt education       Adduction stretch HEP 3 x 30 sec B 3 x 30 sec   Perineum stretch HEP Left 3 x 30 sec  3 x 30 sec   Piriformis stretch   3 x 30 sec   Hamstring HEP     Prone heel squeezes HEP     Prone hip extension      Pelvic floor drops   X 10             The following educational topics were reviewed with patient:  · Treatment/Session Assessment: Pt did excellent with therapy. Anticipate D/C next visit. · Response to Treatment:  Excellent   · Compliance with Program/Exercises: Will assess as treatment progresses. · Recommendations/Intent for next treatment session: \"Next visit will focus on advancements to more challenging activities\".   Total Treatment Duration:  PT Patient Time In/Time Out  Time In: 0730  Time Out: ANKIT Chua 46, PT

## 2017-05-22 ENCOUNTER — HOSPITAL ENCOUNTER (OUTPATIENT)
Dept: PHYSICAL THERAPY | Age: 32
Discharge: HOME | End: 2017-05-22
Payer: COMMERCIAL

## 2017-05-22 PROCEDURE — 97140 MANUAL THERAPY 1/> REGIONS: CPT

## 2017-05-22 NOTE — PROGRESS NOTES
Josiane Chong  : 1985 [facility] at [place] [address],[address], [facility] [address]  [phone]   [phone]          OUTPATIENT PHYSICAL THERAPY:Daily Note and Discharge 2017    ICD-10: Treatment Diagnosis: Myalgia (M79.1)  Precautions/Allergies:   Penicillins   Fall Risk Score: 0 (? 5 = High Risk)  MD Orders: Eval and treat MEDICAL/REFERRING DIAGNOSIS:  Myalgia [M79.1]   DATE OF ONSET:10 + years  REFERRING PHYSICIAN: Elenita Villalta MD  RETURN PHYSICIAN APPOINTMENT: 1 year     INITIAL ASSESSMENT:  Ms. Dk Nash has been seen for 14 physical therapy visits. She has done excellent with PT. No longer has pain with intercourse. She has met all of her short term and long term goals. At this point she is appropriate for D/C with HEP   PROBLEM LIST (Impacting functional limitations):  1. Decreased ADL/Functional Activities  2. Increased Pain  3. Decreased Flexibility/Joint Mobility INTERVENTIONS PLANNED:  1. Neuromuscular re-education  2. Biofeedback as needed  3. HEP  4. Electrical Stimulation  5. Manual Therapy  6. Range of Motion (ROM)  7. Therapeutic Activites  8. Therapeutic Exercise/Strengthening   TREATMENT PLAN:  Effective Dates: 2017 TO 2017. Frequency/Duration: 1 time a week for 12 weeks  GOALS: (Goals have been discussed and agreed upon with patient.)  Short-Term Functional Goals: Time Frame: in 3 weeks  1. Patient will demonstrate independence with home exercise program. (met)  2. Pt will demonstrate good relaxation of pelvic floor mm upon command. (met)  Discharge Goals: Time Frame: in 12 weeks  1. Pt will be able to tolerate insertion of large dilator.  (met)  2.  Pt will report no pain with intercourse during insertion or friction. (met)  Rehabilitation Potential For Stated Goals: Good  Regarding Lisandro Guerrero's therapy, I certify that the treatment plan above will be carried out by a therapist or under their direction. Thank you for this referral,  She Truong PT     Referring Physician Signature: Simi Mac MD              Date                    The information in this section was collected on 2/13/2017 (except where otherwise noted). HISTORY:   Present Symptoms:  Pain Intensity 1: 0 Pain with intercourse. Pain is sharp. Pain is primarily at perineum. No hip or back problems. Pt reports that she is sore the next day in the perineum. History of Present Injury/Illness (Reason for Referral):   Has always had pain with intercourse. Had second child on August 25, 2017. Has not tried intercourse since then. Sister did have ovarian CA. Past Medical History/Comorbidities:   Ms. Tian May  has a past medical history of Anemia; Ectopic pregnancy (2015); Epilepsy ([facility] [address] and Infertility, female. Ms. Tian May  has a past surgical history that includes salpingo-oophorectomy; other surgical (April 2015); and other surgical (12/2002). Social History/Living Environment:    Lives with  and 2 children  Prior Level of Function/Work/Activity:  Mostly sitting at work. Personal Factors:          Past/Current Experience:  Has had pain with intercourse and pain with first tampon insertion. Does not have a left ovary. Current Medications:    Current Outpatient Prescriptions:     ferrous sulfate (IRON) 325 mg (65 mg iron) tablet, Take  by mouth Daily (before breakfast). , Disp: , Rfl:    Date Last Reviewed:  5/22/2017  Gynecological History:   · Number of pregnancies: 3 pregnancies  , vaginal 2,  · Episiotomy: did tear that was repaired   Past Urinary Medical History:    · History of UTI, Menopause: none significant  · Previous Treatments: has talked to an OB previously. Tried a cream that didn't do anything. Voiding Patterns:  Patient voids every 2-4 hours during the day and 0-1 times during the night. Patient reports that she empties bladder.   Bowel Habits:  Patient demonstrates normal bowel habits. Mobility / Self Care: I  Personal / Social History:  · Sexually active? YES: pain is at entrance   Number of Personal Factors/Comorbidities that affect the Plan of Care: 1-2: MODERATE COMPLEXITY   EXAMINATION:   External Observation:   · Voluntary Contraction: present  · Voluntary Relaxation: present  · Involuntary Contraction: present  · Involuntary Relaxation: delayed  · Perineal Body Assessment: WNL  · Reflex: WNL  · Anal [place]: Weak  · Skin Integrity: WNL  · Q-tip Test : tender at 6:00  · Vaginal Vault Size: within normal limits  ·   · Palpation:   Right Left   Bulbocavernosus     Ischocavernosus     Superficial Transverse Perineal  Slight tenderness at midleing   Sphincter Urethrae     Compressor Urethra      Urethra-vaginalis     Deep Transverse Perineium     Obturator Internus     Iliococcygeus     Coccygeus     Pubococcygeus     Levator Ani  Slight tenderness   Adductor     Psoas           Body Structures Involved:  1. Muscles  2. vestibule of vagina Body Functions Affected:  1. Genitourinary  2. Reproductive  3. Neuromusculoskeletal Activities and Participation Affected:  1. Interpersonal Interactions and Relationships   Number of elements that affect the Plan of Care: 3: MODERATE COMPLEXITY   CLINICAL PRESENTATION:   Presentation: Evolving clinical presentation with changing clinical characteristics: MODERATE COMPLEXITY   CLINICAL DECISION MAKING:   Outcome Measure: Tool Used: Pelvic Floor Disability Index (PFDI-20)  Score:  Initial: 8/10 Most Recent: 2  (Date: 5/22/2017    )   Interpretation of Score: This survey asks questions concerning certain  bowel, bladder, or pelvic symptoms and how much this symptoms interfere with daily activiites. Each section is scored on a 0-4 scale, 4 representing the greatest disability. The scores of each section are added together for a total score out of 70.    Score 0 1-13 14-27 28-41 42-55 56-69 70   Modifier CH CI CJ CK CL CM CN     Medical Necessity: · Patient is expected to demonstrate progress in range of motion and coordination to increase independence with intercourse. · Patient demonstrates good rehab potential due to higher previous functional level. Reason for Services/Other Comments:  · Patient continues to require skilled intervention due to increase tenderness at vestibule and poor coordination of pelvic floor muscles contributing to painful intercoures. .   Use of outcome tool(s) and clinical judgement create a POC that gives a: Questionable prediction of patient's progress: MODERATE COMPLEXITY   TREATMENT:   (In addition to Assessment/Re-Assessment sessions the following treatments were rendered)  Pre-treatment Symptoms/Complaints:  Pt reports that she did not experience any pain with intercourse this week. Doing well. Pain: Initial:   Pain Intensity 1: 0 0/10 Post Session:  0/10     THERAPEUTIC EXERCISE: (8 minutes):  Exercises per grid below to improve mobility and coordination. Required moderate verbal and tactile cues to promote proper body breathing techniques. Progressed repetitions and complexity of movement as indicated. MANUAL THERAPY: (40 minutes): Soft tissue mobilization was utilized and necessary because of the patient's restricted motion of soft tissue. SCS and Trigger point to bilateral LA, OI, and perineum to reduce tone and improve tissue elasticity B. Soft tissue to bilateral adductors  Low light laser for 15 sec x 2 at perineum level 3. (not performed)   Dilators Amin size 5, and 6  Slight pain with dilator 6 at 1/10 that resolved quickly.      Exercises:  Patient instructed in pelvic floor exercises listed below:   Date:  5/8/2017 Date:  5/22/2017   Activity/Exercise     Pt education   5 min    Adduction stretch 3 x 30 sec 3 x 30 sec   Perineum stretch 3 x 30 sec 3 x 30 sec   Piriformis stretch 3 x 30 sec    Hamstring     Prone heel squeezes     Prone hip extension     Pelvic floor drops X 10             The following educational topics were reviewed with patient: Dilators  · Treatment/Session Assessment: Pt has done excellent with PT.   Appropriate for D/C  · Response to Treatment:  Excellent     Total Treatment Duration:  PT Patient Time In/Time Out  Time In: 0730  Time Out: 0830    Iza Hurt PT

## 2020-05-31 PROBLEM — Z80.41 FAMILY HISTORY OF OVARIAN CANCER: Status: ACTIVE | Noted: 2020-05-31

## 2020-10-08 PROBLEM — O09.523 MULTIGRAVIDA OF ADVANCED MATERNAL AGE IN THIRD TRIMESTER: Status: ACTIVE | Noted: 2020-10-08

## 2020-10-08 PROBLEM — O24.410 DIET CONTROLLED GESTATIONAL DIABETES MELLITUS (GDM): Status: ACTIVE | Noted: 2020-10-08

## 2020-10-12 ENCOUNTER — HOSPITAL ENCOUNTER (OUTPATIENT)
Dept: DIABETES SERVICES | Age: 35
Discharge: HOME | End: 2020-10-12
Payer: COMMERCIAL

## 2020-10-12 VITALS — WEIGHT: 223 LBS | BODY MASS INDEX: 34.93 KG/M2

## 2020-10-12 PROCEDURE — G0109 DIAB MANAGE TRN IND/GROUP: HCPCS

## 2020-10-12 NOTE — PROGRESS NOTES
This is a class  appointment with limited persons allowed in class due to FXDCG-79 public health emergency. Social distancing and mandatory precautions are in place and utilized. Gestational Diabetes Self-Management Support Plan    Services Provided: Pt received education on nutrition and meal planning during pregnancy. Emotional support for adjustment to diagnosis was provided. Care Plan/Recommendations:  Pt instructed to record blood sugar 4x/day and record all meals and snacks. Pt to bring information to appointments with [facility]. Notes for Follow Up:   1. Barriers to checking blood glucose and adherence to meal plan identified: none  2. Barriers to psychological adjustment to diagnosis identified: none  3. Patient needs to be seen by [facility] ASAP due to: appointment is 10/14/20.     Magdalene Cash, [address], , CDE  [facility] [address]

## 2020-10-14 PROBLEM — O24.414 INSULIN CONTROLLED GESTATIONAL DIABETES MELLITUS (GDM) IN THIRD TRIMESTER: Status: ACTIVE | Noted: 2020-10-08

## 2020-10-14 PROBLEM — O99.013 ANEMIA AFFECTING PREGNANCY IN THIRD TRIMESTER: Status: ACTIVE | Noted: 2020-10-14

## 2020-10-14 PROBLEM — O99.213 OBESITY AFFECTING PREGNANCY IN THIRD TRIMESTER: Status: ACTIVE | Noted: 2020-10-14

## 2020-11-04 PROBLEM — Z80.41 FAMILY HISTORY OF OVARIAN CANCER: Status: RESOLVED | Noted: 2020-05-31 | Resolved: 2020-11-04

## 2020-12-04 ENCOUNTER — HOSPITAL ENCOUNTER (OUTPATIENT)
Dept: LAB | Age: 35
Discharge: HOME | End: 2020-12-04
Payer: COMMERCIAL

## 2020-12-04 DIAGNOSIS — O99.013 ANEMIA AFFECTING PREGNANCY IN THIRD TRIMESTER: ICD-10-CM

## 2020-12-04 LAB
ALBUMIN SERPL-MCNC: 2.7 G/DL (ref 3.5–5)
ALBUMIN/GLOB SERPL: 0.6 {RATIO} (ref 1.2–3.5)
ALP SERPL-CCNC: 128 U/L (ref 50–136)
ALT SERPL-CCNC: 14 U/L (ref 12–65)
ANION GAP SERPL CALC-SCNC: 9 MMOL/L (ref 7–16)
APPEARANCE UR: ABNORMAL
AST SERPL-CCNC: 14 U/L (ref 15–37)
BACTERIA URNS QL MICRO: ABNORMAL /HPF
BASOPHILS # BLD: 0 K/UL (ref 0–0.2)
BASOPHILS NFR BLD: 1 % (ref 0–2)
BILIRUB SERPL-MCNC: 0.3 MG/DL (ref 0.2–1.1)
BILIRUB UR QL: NEGATIVE
BUN SERPL-MCNC: 15 MG/DL (ref 6–23)
CALCIUM SERPL-MCNC: 9.4 MG/DL (ref 8.3–10.4)
CASTS URNS QL MICRO: 0 /LPF
CEA SERPL-MCNC: 0.8 NG/ML (ref 0–3)
CHLORIDE SERPL-SCNC: 105 MMOL/L (ref 98–107)
CO2 SERPL-SCNC: 22 MMOL/L (ref 21–32)
COLOR UR: YELLOW
CREAT SERPL-MCNC: 0.6 MG/DL (ref 0.6–1)
CRYSTALS URNS QL MICRO: 0 /LPF
DAT POLY-SP REAG RBC QL: NORMAL
DIFFERENTIAL METHOD BLD: ABNORMAL
EOSINOPHIL # BLD: 0.1 K/UL (ref 0–0.8)
EOSINOPHIL NFR BLD: 1 % (ref 0.5–7.8)
EPI CELLS #/AREA URNS HPF: ABNORMAL /HPF
ERYTHROCYTE [DISTWIDTH] IN BLOOD BY AUTOMATED COUNT: 23.2 % (ref 11.9–14.6)
ERYTHROCYTE [SEDIMENTATION RATE] IN BLOOD: 51 MM/HR (ref 0–20)
FERRITIN SERPL-MCNC: 17 NG/ML (ref 8–388)
FOLATE SERPL-MCNC: 61.3 NG/ML (ref 3.1–17.5)
GLOBULIN SER CALC-MCNC: 4.3 G/DL (ref 2.3–3.5)
GLUCOSE SERPL-MCNC: 88 MG/DL (ref 65–100)
GLUCOSE UR STRIP.AUTO-MCNC: NEGATIVE MG/DL
HAV IGM SER QL: NONREACTIVE
HBV CORE IGM SER QL: NONREACTIVE
HBV SURFACE AG SER QL: NONREACTIVE
HCT VFR BLD AUTO: 33.1 % (ref 35.8–46.3)
HCV AB SER QL: NONREACTIVE
HGB BLD-MCNC: 10.2 G/DL (ref 11.7–15.4)
HGB RETIC QN AUTO: 33 PG (ref 29–35)
HGB UR QL STRIP: ABNORMAL
HIV 1+2 AB+HIV1 P24 AG SERPL QL IA: NONREACTIVE
HIV12 RESULT COMMENT, HHIVC: ABNORMAL
IMM GRANULOCYTES # BLD AUTO: 0.1 K/UL (ref 0–0.5)
IMM GRANULOCYTES NFR BLD AUTO: 1 % (ref 0–5)
IMM RETICS NFR: 32.2 % (ref 3–15.9)
IRON SATN MFR SERPL: 33 %
IRON SERPL-MCNC: 170 UG/DL (ref 35–150)
KETONES UR QL STRIP.AUTO: NEGATIVE MG/DL
LDH SERPL L TO P-CCNC: 141 U/L (ref 100–190)
LEUKOCYTE ESTERASE UR QL STRIP.AUTO: ABNORMAL
LYMPHOCYTES # BLD: 1.5 K/UL (ref 0.5–4.6)
LYMPHOCYTES NFR BLD: 18 % (ref 13–44)
MCH RBC QN AUTO: 24.4 PG (ref 26.1–32.9)
MCHC RBC AUTO-ENTMCNC: 30.8 G/DL (ref 31.4–35)
MCV RBC AUTO: 79.2 FL (ref 79.6–97.8)
MONOCYTES # BLD: 0.5 K/UL (ref 0.1–1.3)
MONOCYTES NFR BLD: 6 % (ref 4–12)
MUCOUS THREADS URNS QL MICRO: 0 /LPF
NEUTS SEG # BLD: 6.5 K/UL (ref 1.7–8.2)
NEUTS SEG NFR BLD: 75 % (ref 43–78)
NITRITE UR QL STRIP.AUTO: NEGATIVE
NRBC # BLD: 0 K/UL (ref 0–0.2)
PH UR STRIP: 6 [PH] (ref 5–9)
PHOSPHATE SERPL-MCNC: 4 MG/DL (ref 2.5–4.5)
PLATELET # BLD AUTO: 262 K/UL (ref 150–450)
PMV BLD AUTO: 10.8 FL (ref 9.4–12.3)
POTASSIUM SERPL-SCNC: 4 MMOL/L (ref 3.5–5.1)
PROT SERPL-MCNC: 7 G/DL (ref 6.3–8.2)
PROT UR STRIP-MCNC: NEGATIVE MG/DL
RBC # BLD AUTO: 4.18 M/UL (ref 4.05–5.25)
RBC #/AREA URNS HPF: ABNORMAL /HPF
RETICS # AUTO: 0.15 M/UL (ref 0.03–0.1)
RETICS/RBC NFR AUTO: 3.6 % (ref 0.3–2)
SODIUM SERPL-SCNC: 136 MMOL/L (ref 136–145)
SP GR UR REFRACTOMETRY: 1.02 (ref 1–1.02)
T4 FREE SERPL-MCNC: 1 NG/DL (ref 0.78–1.4)
TIBC SERPL-MCNC: 520 UG/DL (ref 250–450)
TSH SERPL DL<=0.005 MIU/L-ACNC: 1.43 UIU/ML (ref 0.36–3)
URATE SERPL-MCNC: 5.7 MG/DL (ref 2.6–6)
UROBILINOGEN UR QL STRIP.AUTO: 0.2 EU/DL (ref 0.2–1)
VIT B12 SERPL-MCNC: 490 PG/ML (ref 193–986)
WBC # BLD AUTO: 8.8 K/UL (ref 4.3–11.1)
WBC URNS QL MICRO: ABNORMAL /HPF

## 2020-12-04 PROCEDURE — 82378 CARCINOEMBRYONIC ANTIGEN: CPT

## 2020-12-04 PROCEDURE — 86038 ANTINUCLEAR ANTIBODIES: CPT

## 2020-12-04 PROCEDURE — 84100 ASSAY OF PHOSPHORUS: CPT

## 2020-12-04 PROCEDURE — 85652 RBC SED RATE AUTOMATED: CPT

## 2020-12-04 PROCEDURE — 82607 VITAMIN B-12: CPT

## 2020-12-04 PROCEDURE — 84550 ASSAY OF BLOOD/URIC ACID: CPT

## 2020-12-04 PROCEDURE — 86334 IMMUNOFIX E-PHORESIS SERUM: CPT

## 2020-12-04 PROCEDURE — 83615 LACTATE (LD) (LDH) ENZYME: CPT

## 2020-12-04 PROCEDURE — 84238 ASSAY NONENDOCRINE RECEPTOR: CPT

## 2020-12-04 PROCEDURE — 84439 ASSAY OF FREE THYROXINE: CPT

## 2020-12-04 PROCEDURE — 83540 ASSAY OF IRON: CPT

## 2020-12-04 PROCEDURE — 86880 COOMBS TEST DIRECT: CPT

## 2020-12-04 PROCEDURE — 84443 ASSAY THYROID STIM HORMONE: CPT

## 2020-12-04 PROCEDURE — 87389 HIV-1 AG W/HIV-1&-2 AB AG IA: CPT

## 2020-12-04 PROCEDURE — 82747 ASSAY OF FOLIC ACID RBC: CPT

## 2020-12-04 PROCEDURE — 80074 ACUTE HEPATITIS PANEL: CPT

## 2020-12-04 PROCEDURE — 83021 HEMOGLOBIN CHROMOTOGRAPHY: CPT

## 2020-12-04 PROCEDURE — 84165 PROTEIN E-PHORESIS SERUM: CPT

## 2020-12-04 PROCEDURE — 83883 ASSAY NEPHELOMETRY NOT SPEC: CPT

## 2020-12-04 PROCEDURE — 85025 COMPLETE CBC W/AUTO DIFF WBC: CPT

## 2020-12-04 PROCEDURE — 82728 ASSAY OF FERRITIN: CPT

## 2020-12-04 PROCEDURE — 80053 COMPREHEN METABOLIC PANEL: CPT

## 2020-12-04 PROCEDURE — 81015 MICROSCOPIC EXAM OF URINE: CPT

## 2020-12-04 PROCEDURE — 81003 URINALYSIS AUTO W/O SCOPE: CPT

## 2020-12-04 PROCEDURE — 82746 ASSAY OF FOLIC ACID SERUM: CPT

## 2020-12-04 PROCEDURE — 36415 COLL VENOUS BLD VENIPUNCTURE: CPT

## 2020-12-04 PROCEDURE — 85046 RETICYTE/HGB CONCENTRATE: CPT

## 2020-12-05 LAB
ANA SER QL: NEGATIVE
TRANSFERRIN SERPL-SCNC: 38.7 NMOL/L (ref 12.2–27.3)

## 2020-12-06 LAB
FOLATE BLD-MCNC: 462 NG/ML
FOLATE RBC-MCNC: 1422 NG/ML
HCT VFR BLD AUTO: 32.5 % (ref 34–46.6)

## 2020-12-07 ENCOUNTER — HOSPITAL ENCOUNTER (OUTPATIENT)
Dept: LAB | Age: 35
Discharge: HOME | End: 2020-12-07
Payer: COMMERCIAL

## 2020-12-07 DIAGNOSIS — O99.013 ANEMIA AFFECTING PREGNANCY IN THIRD TRIMESTER: ICD-10-CM

## 2020-12-07 LAB
DEPRECATED HGB OTHER BLD-IMP: 0 %
HGB A MFR BLD: 98.2 % (ref 96.4–98.8)
HGB A2 MFR BLD COLUMN CHROM: 1.8 % (ref 1.8–3.2)
HGB C MFR BLD: 0 %
HGB F MFR BLD: 0 % (ref 0–2)
HGB FRACT BLD-IMP: NORMAL
HGB S BLD QL SOLY: NEGATIVE
HGB S MFR BLD: 0 %
KAPPA LC FREE SER-MCNC: 17.91 MG/L (ref 3.3–19.4)
KAPPA LC FREE/LAMBDA FREE SER: 1.35 {RATIO} (ref 0.26–1.65)
LAMBDA LC FREE SERPL-MCNC: 13.24 MG/L (ref 5.71–26.3)

## 2020-12-07 PROCEDURE — 86335 IMMUNFIX E-PHORSIS/URINE/CSF: CPT

## 2020-12-07 PROCEDURE — 84156 ASSAY OF PROTEIN URINE: CPT

## 2020-12-08 LAB
ALBUMIN SERPL ELPH-MCNC: 3.01 G/DL (ref 3.2–5.6)
ALBUMIN UR ELPH-MCNC: 2.1 MG/DL
ALBUMIN/GLOB SERPL: 0.1 {RATIO}
ALBUMIN/GLOB SERPL: 0.8 {RATIO}
ALPHA1 GLOB 24H UR ELPH-MCNC: 0.9 MG/DL
ALPHA1 GLOB SERPL ELPH-MCNC: 0.42 G/DL (ref 0.1–0.4)
ALPHA2 GLOB SERPL ELPH-MCNC: 0.87 G/DL (ref 0.4–1.2)
ALPHA2 GLOB SERPL ELPH-MCNC: 2.6 MG/DL
B-GLOBULIN SERPL QL ELPH: 1.29 G/DL (ref 0.6–1.3)
B-GLOBULIN UR QL ELPH: 7.4 MG/DL
COLLECT DURATION TIME UR: 22 HR
GAMMA GLOB MFR SERPL ELPH: 1 G/DL (ref 0.5–1.6)
GAMMA GLOB MFR UR ELPH: 4 MG/DL
IGA SERPL-MCNC: 176 MG/DL (ref 85–499)
IGG SERPL-MCNC: 790 MG/DL (ref 610–1616)
IGM SERPL-MCNC: 358 MG/DL (ref 35–242)
M PROTEIN SERPL ELPH-MCNC: ABNORMAL G/DL
M PROTEIN UR-MCNC: NORMAL MG/DL
PROT 24H UR-MRATE: 255 MG/24HR
PROT PATTERN SERPL ELPH-IMP: ABNORMAL
PROT PATTERN SPEC IFE-IMP: ABNORMAL
PROT PATTERN SPEC IFE-IMP: NORMAL
PROT PATTERN UR ELPH-IMP: NORMAL
PROT SERPL-MCNC: 6.6 G/DL (ref 6.3–8.2)
PROT UR-MCNC: 17 MG/DL
SPECIMEN VOL ?TM UR: 1500 ML

## 2020-12-09 LAB — PERIPHERAL SMEAR,PSM: NORMAL

## 2020-12-21 ENCOUNTER — HOSPITAL ENCOUNTER (INPATIENT)
Age: 35
LOS: 3 days | Discharge: HOME | End: 2020-12-24
Attending: OBSTETRICS & GYNECOLOGY | Admitting: OBSTETRICS & GYNECOLOGY
Payer: COMMERCIAL

## 2020-12-21 PROBLEM — Z3A.39 39 WEEKS GESTATION OF PREGNANCY: Status: ACTIVE | Noted: 2020-12-21

## 2020-12-21 PROBLEM — Z34.90 ENCOUNTER FOR INDUCTION OF LABOR: Status: ACTIVE | Noted: 2020-12-21

## 2020-12-21 PROBLEM — O24.419 GDM (GESTATIONAL DIABETES MELLITUS): Status: ACTIVE | Noted: 2020-12-21

## 2020-12-21 LAB
ABO + RH BLD: NORMAL
BLOOD GROUP ANTIBODIES SERPL: NORMAL
ERYTHROCYTE [DISTWIDTH] IN BLOOD BY AUTOMATED COUNT: 25.1 % (ref 11.9–14.6)
GLUCOSE BLD STRIP.AUTO-MCNC: 98 MG/DL (ref 65–100)
HCT VFR BLD AUTO: 38.8 % (ref 35.8–46.3)
HGB BLD-MCNC: 12.4 G/DL (ref 11.7–15.4)
MCH RBC QN AUTO: 26.7 PG (ref 26.1–32.9)
MCHC RBC AUTO-ENTMCNC: 32 G/DL (ref 31.4–35)
MCV RBC AUTO: 83.6 FL (ref 79.6–97.8)
NRBC # BLD: 0 K/UL (ref 0–0.2)
PLATELET # BLD AUTO: 251 K/UL (ref 150–450)
PMV BLD AUTO: 11 FL (ref 9.4–12.3)
RBC # BLD AUTO: 4.64 M/UL (ref 4.05–5.2)
SPECIMEN EXP DATE BLD: NORMAL
WBC # BLD AUTO: 8.6 K/UL (ref 4.3–11.1)

## 2020-12-21 PROCEDURE — 85027 COMPLETE CBC AUTOMATED: CPT

## 2020-12-21 PROCEDURE — 74011250637 HC RX REV CODE- 250/637: Performed by: OBSTETRICS & GYNECOLOGY

## 2020-12-21 PROCEDURE — 86900 BLOOD TYPING SEROLOGIC ABO: CPT

## 2020-12-21 PROCEDURE — 82962 GLUCOSE BLOOD TEST: CPT

## 2020-12-21 PROCEDURE — 65270000029 HC RM PRIVATE

## 2020-12-21 RX ORDER — SODIUM CHLORIDE 0.9 % (FLUSH) 0.9 %
5-40 SYRINGE (ML) INJECTION AS NEEDED
Status: DISCONTINUED | OUTPATIENT
Start: 2020-12-21 | End: 2020-12-24 | Stop reason: HOSPADM

## 2020-12-21 RX ORDER — OXYTOCIN/RINGER'S LACTATE 30/500 ML
10 PLASTIC BAG, INJECTION (ML) INTRAVENOUS AS NEEDED
Status: COMPLETED | OUTPATIENT
Start: 2020-12-21 | End: 2020-12-22

## 2020-12-21 RX ORDER — BUTORPHANOL TARTRATE 2 MG/ML
1 INJECTION INTRAMUSCULAR; INTRAVENOUS
Status: DISCONTINUED | OUTPATIENT
Start: 2020-12-21 | End: 2020-12-22 | Stop reason: HOSPADM

## 2020-12-21 RX ORDER — SODIUM CHLORIDE 0.9 % (FLUSH) 0.9 %
5-40 SYRINGE (ML) INJECTION EVERY 8 HOURS
Status: DISCONTINUED | OUTPATIENT
Start: 2020-12-21 | End: 2020-12-24 | Stop reason: HOSPADM

## 2020-12-21 RX ORDER — DEXTROSE, SODIUM CHLORIDE, SODIUM LACTATE, POTASSIUM CHLORIDE, AND CALCIUM CHLORIDE 5; .6; .31; .03; .02 G/100ML; G/100ML; G/100ML; G/100ML; G/100ML
125 INJECTION, SOLUTION INTRAVENOUS CONTINUOUS
Status: DISCONTINUED | OUTPATIENT
Start: 2020-12-21 | End: 2020-12-24 | Stop reason: HOSPADM

## 2020-12-21 RX ORDER — LIDOCAINE HYDROCHLORIDE 20 MG/ML
JELLY TOPICAL
Status: DISCONTINUED | OUTPATIENT
Start: 2020-12-21 | End: 2020-12-22 | Stop reason: HOSPADM

## 2020-12-21 RX ORDER — LIDOCAINE HYDROCHLORIDE 10 MG/ML
1 INJECTION INFILTRATION; PERINEURAL
Status: COMPLETED | OUTPATIENT
Start: 2020-12-21 | End: 2020-12-22

## 2020-12-21 RX ORDER — INSULIN GLARGINE 100 [IU]/ML
6 INJECTION, SOLUTION SUBCUTANEOUS EVERY MORNING
Status: DISCONTINUED | OUTPATIENT
Start: 2020-12-22 | End: 2020-12-24 | Stop reason: HOSPADM

## 2020-12-21 RX ORDER — OXYTOCIN/RINGER'S LACTATE 30/500 ML
87.3 PLASTIC BAG, INJECTION (ML) INTRAVENOUS AS NEEDED
Status: COMPLETED | OUTPATIENT
Start: 2020-12-21 | End: 2020-12-22

## 2020-12-21 RX ORDER — MINERAL OIL
120 OIL (ML) ORAL
Status: DISCONTINUED | OUTPATIENT
Start: 2020-12-21 | End: 2020-12-22 | Stop reason: HOSPADM

## 2020-12-21 RX ORDER — INSULIN GLARGINE 100 [IU]/ML
20 INJECTION, SOLUTION SUBCUTANEOUS
Status: DISCONTINUED | OUTPATIENT
Start: 2020-12-21 | End: 2020-12-24 | Stop reason: HOSPADM

## 2020-12-21 RX ADMIN — INSULIN GLARGINE 20 UNITS: 100 INJECTION, SOLUTION SUBCUTANEOUS at 22:00

## 2020-12-21 RX ADMIN — MISOPROSTOL 50 MCG: 100 TABLET ORAL at 20:52

## 2020-12-21 NOTE — PROGRESS NOTES
Patient ID verified. Allergies, medical history, prenatal record and prior to admission medications verified. Pt instructed to be NPO after midnight. Pt instructed  to call the hospital at 1500 come to entrance C and sign in at the registration desk on the 4th floor. Patient instructed to come to hospital sooner if SROM, labor, or concerning symptoms. Patient verbalized understanding. Questions encouraged and answered.

## 2020-12-22 ENCOUNTER — ANESTHESIA EVENT (OUTPATIENT)
Dept: LABOR AND DELIVERY | Age: 35
End: 2020-12-22
Payer: COMMERCIAL

## 2020-12-22 ENCOUNTER — ANESTHESIA (OUTPATIENT)
Dept: LABOR AND DELIVERY | Age: 35
End: 2020-12-22
Payer: COMMERCIAL

## 2020-12-22 LAB
BLOOD BANK CMNT PATIENT-IMP: NORMAL
FETAL SCREEN,FMHS: NORMAL
GLUCOSE BLD STRIP.AUTO-MCNC: 88 MG/DL (ref 65–100)

## 2020-12-22 PROCEDURE — 74011250636 HC RX REV CODE- 250/636: Performed by: OBSTETRICS & GYNECOLOGY

## 2020-12-22 PROCEDURE — 74011000250 HC RX REV CODE- 250: Performed by: NURSE ANESTHETIST, CERTIFIED REGISTERED

## 2020-12-22 PROCEDURE — 75410000002 HC LABOR FEE PER 1 HR

## 2020-12-22 PROCEDURE — 74011250636 HC RX REV CODE- 250/636: Performed by: NURSE ANESTHETIST, CERTIFIED REGISTERED

## 2020-12-22 PROCEDURE — 2709999900 HC NON-CHARGEABLE SUPPLY

## 2020-12-22 PROCEDURE — 3E0P7VZ INTRODUCTION OF HORMONE INTO FEMALE REPRODUCTIVE, VIA NATURAL OR ARTIFICIAL OPENING: ICD-10-PCS | Performed by: OBSTETRICS & GYNECOLOGY

## 2020-12-22 PROCEDURE — A4300 CATH IMPL VASC ACCESS PORTAL: HCPCS | Performed by: NURSE ANESTHETIST, CERTIFIED REGISTERED

## 2020-12-22 PROCEDURE — 82962 GLUCOSE BLOOD TEST: CPT

## 2020-12-22 PROCEDURE — 74011250637 HC RX REV CODE- 250/637: Performed by: OBSTETRICS & GYNECOLOGY

## 2020-12-22 PROCEDURE — 75410000000 HC DELIVERY VAGINAL/SINGLE

## 2020-12-22 PROCEDURE — 77030018846 HC SOL IRR STRL H20 ICUM -A

## 2020-12-22 PROCEDURE — 77030014125 HC TY EPDRL BBMI -B: Performed by: ANESTHESIOLOGY

## 2020-12-22 PROCEDURE — 36415 COLL VENOUS BLD VENIPUNCTURE: CPT

## 2020-12-22 PROCEDURE — 0KQM0ZZ REPAIR PERINEUM MUSCLE, OPEN APPROACH: ICD-10-PCS | Performed by: OBSTETRICS & GYNECOLOGY

## 2020-12-22 PROCEDURE — 75410000003 HC RECOV DEL/VAG/CSECN EA 0.5 HR

## 2020-12-22 PROCEDURE — 77030002933 HC SUT MCRYL J&J -A

## 2020-12-22 PROCEDURE — 00HU33Z INSERTION OF INFUSION DEVICE INTO SPINAL CANAL, PERCUTANEOUS APPROACH: ICD-10-PCS | Performed by: ANESTHESIOLOGY

## 2020-12-22 PROCEDURE — 76060000078 HC EPIDURAL ANESTHESIA

## 2020-12-22 PROCEDURE — 85461 HEMOGLOBIN FETAL: CPT

## 2020-12-22 PROCEDURE — 65270000029 HC RM PRIVATE

## 2020-12-22 PROCEDURE — 74011000250 HC RX REV CODE- 250: Performed by: OBSTETRICS & GYNECOLOGY

## 2020-12-22 RX ORDER — LOPERAMIDE HYDROCHLORIDE 2 MG/1
2 CAPSULE ORAL
Status: DISCONTINUED | OUTPATIENT
Start: 2020-12-22 | End: 2020-12-24 | Stop reason: HOSPADM

## 2020-12-22 RX ORDER — ZOLPIDEM TARTRATE 5 MG/1
5 TABLET ORAL
Status: DISCONTINUED | OUTPATIENT
Start: 2020-12-22 | End: 2020-12-24 | Stop reason: HOSPADM

## 2020-12-22 RX ORDER — NALOXONE HYDROCHLORIDE 0.4 MG/ML
0.4 INJECTION, SOLUTION INTRAMUSCULAR; INTRAVENOUS; SUBCUTANEOUS AS NEEDED
Status: DISCONTINUED | OUTPATIENT
Start: 2020-12-22 | End: 2020-12-24 | Stop reason: HOSPADM

## 2020-12-22 RX ORDER — OXYCODONE HYDROCHLORIDE 5 MG/1
10 TABLET ORAL
Status: DISCONTINUED | OUTPATIENT
Start: 2020-12-22 | End: 2020-12-24 | Stop reason: HOSPADM

## 2020-12-22 RX ORDER — ONDANSETRON 4 MG/1
4 TABLET, ORALLY DISINTEGRATING ORAL
Status: ACTIVE | OUTPATIENT
Start: 2020-12-22 | End: 2020-12-23

## 2020-12-22 RX ORDER — IBUPROFEN 800 MG/1
800 TABLET ORAL
Status: DISCONTINUED | OUTPATIENT
Start: 2020-12-22 | End: 2020-12-24 | Stop reason: HOSPADM

## 2020-12-22 RX ORDER — OXYCODONE HYDROCHLORIDE 5 MG/1
5 TABLET ORAL
Status: DISCONTINUED | OUTPATIENT
Start: 2020-12-22 | End: 2020-12-24 | Stop reason: HOSPADM

## 2020-12-22 RX ORDER — SIMETHICONE 80 MG
80 TABLET,CHEWABLE ORAL
Status: DISCONTINUED | OUTPATIENT
Start: 2020-12-22 | End: 2020-12-24 | Stop reason: HOSPADM

## 2020-12-22 RX ORDER — ROPIVACAINE HYDROCHLORIDE 2 MG/ML
INJECTION, SOLUTION EPIDURAL; INFILTRATION; PERINEURAL AS NEEDED
Status: DISCONTINUED | OUTPATIENT
Start: 2020-12-22 | End: 2020-12-22 | Stop reason: HOSPADM

## 2020-12-22 RX ORDER — DIPHENHYDRAMINE HCL 25 MG
25 CAPSULE ORAL
Status: DISCONTINUED | OUTPATIENT
Start: 2020-12-22 | End: 2020-12-24 | Stop reason: HOSPADM

## 2020-12-22 RX ORDER — LIDOCAINE HYDROCHLORIDE AND EPINEPHRINE 15; 5 MG/ML; UG/ML
INJECTION, SOLUTION EPIDURAL AS NEEDED
Status: DISCONTINUED | OUTPATIENT
Start: 2020-12-22 | End: 2020-12-22 | Stop reason: HOSPADM

## 2020-12-22 RX ORDER — ROPIVACAINE HYDROCHLORIDE 2 MG/ML
INJECTION, SOLUTION EPIDURAL; INFILTRATION; PERINEURAL
Status: DISCONTINUED | OUTPATIENT
Start: 2020-12-22 | End: 2020-12-22 | Stop reason: HOSPADM

## 2020-12-22 RX ORDER — FENTANYL CITRATE 50 UG/ML
INJECTION, SOLUTION INTRAMUSCULAR; INTRAVENOUS AS NEEDED
Status: DISCONTINUED | OUTPATIENT
Start: 2020-12-22 | End: 2020-12-22 | Stop reason: HOSPADM

## 2020-12-22 RX ORDER — DOCUSATE SODIUM 100 MG/1
100 CAPSULE, LIQUID FILLED ORAL
Status: DISCONTINUED | OUTPATIENT
Start: 2020-12-22 | End: 2020-12-24 | Stop reason: HOSPADM

## 2020-12-22 RX ADMIN — WITCH HAZEL 1 PAD: 500 SOLUTION RECTAL; TOPICAL at 12:49

## 2020-12-22 RX ADMIN — LIDOCAINE HYDROCHLORIDE,EPINEPHRINE BITARTRATE 3 ML: 15; .005 INJECTION, SOLUTION EPIDURAL; INFILTRATION; INTRACAUDAL; PERINEURAL at 09:38

## 2020-12-22 RX ADMIN — IBUPROFEN 800 MG: 800 TABLET ORAL at 22:17

## 2020-12-22 RX ADMIN — Medication 1 SPRAY: at 12:49

## 2020-12-22 RX ADMIN — IBUPROFEN 800 MG: 800 TABLET ORAL at 12:49

## 2020-12-22 RX ADMIN — ROPIVACAINE HYDROCHLORIDE 10 ML/HR: 2 INJECTION, SOLUTION EPIDURAL; INFILTRATION at 09:40

## 2020-12-22 RX ADMIN — MISOPROSTOL 50 MCG: 100 TABLET ORAL at 05:00

## 2020-12-22 RX ADMIN — Medication 10 ML: at 09:40

## 2020-12-22 RX ADMIN — MISOPROSTOL 50 MCG: 100 TABLET ORAL at 00:58

## 2020-12-22 RX ADMIN — FENTANYL CITRATE 100 MCG: 50 INJECTION INTRAMUSCULAR; INTRAVENOUS at 09:47

## 2020-12-22 RX ADMIN — OXYTOCIN 87.3 MILLI-UNITS/MIN: 10 INJECTION INTRAVENOUS at 11:00

## 2020-12-22 RX ADMIN — LIDOCAINE HYDROCHLORIDE 0.1 ML: 10 INJECTION, SOLUTION INFILTRATION; PERINEURAL at 10:02

## 2020-12-22 RX ADMIN — Medication 10000 MILLI-UNITS: at 09:55

## 2020-12-22 NOTE — PROGRESS NOTES
SBAR IN Report: Mother    Verbal report received from [id number] (full name & credentials) on this patient, who is now being transferred from R (unit) for routine progression of care. The patient is not wearing a green \"Anesthesia-Duramorph\" band. Report consisted of patient's Situation, Background, Assessment and Recommendations (SBAR).  ID bands were compared with the identification form, and verified with the patient and transferring nurse. Information from the SBAR, Procedure Summary, Intake/Output, MAR and Recent Results and the [place] Report was reviewed with the transferring nurse; opportunity for questions and clarification provided.

## 2020-12-22 NOTE — ANESTHESIA PROCEDURE NOTES
Epidural Block    Patient location during procedure: OB  Start time: 12/22/2020 9:30 AM  End time: 12/22/2020 9:37 AM  Reason for block: primary anesthetic  Staffing  Performed: attending   Anesthesiologist: Aleksandra Balderrama MD  Preanesthetic Checklist  Completed: patient identified, IV checked, site marked, risks and benefits discussed, surgical consent, monitors and equipment checked, pre-op evaluation and timeout performed  Block Placement  Patient position: sitting  Prep: ChloraPrep  Sterility prep: cap, drape, gloves, hand and mask  Sedation level: no sedation  Patient monitoring: heart rate  Approach: midline  Location: lumbar  Lumbar location: L3-L4  Epidural  Loss of resistance technique: saline  Guidance: landmark technique  Needle  Needle type: Tuohy   Needle gauge: 17 G  Needle length: 9 cm  Needle insertion depth: 5 cm  Catheter size: 19 G  Catheter at skin depth: 8 cm  Catheter securement method: liquid medical adhesive and surgical tape  Test dose: negative  Assessment  Number of attempts: 1  Procedure assessment: patient tolerated procedure well with no complications

## 2020-12-22 NOTE — PROGRESS NOTES
Admission assessment complete as noted. Patient oriented to room and unit. Plan of care reviewed and patient verbalizes understanding. Questions encouraged and answered. Patent encouraged to call for needs or concerns. Safety Teaching reviewed:   1. Hand hygiene prior to handling the infant. 2. Use of bulb syringe. 3. Bracelets with matching numbers are placed on mother and infant  4. An infant security tag  [facility]) is placed on the infant's ankle and monitored  5. All OB nurses wear pink Employee badges - do not give your baby to anyone without proper identification. 6. Never leave the baby alone in the room. 7. The infant should be placed on their back to sleep. on a firm mattress. No toys should be placed in the crib. (safe sleep video offered to view)  8. Never shake the baby (video offered to view)  9. Infant fall prevention - do not sleep with the baby, and place the baby in the crib while ambulating. 8. Mother and Baby Care booklet given to Mother.

## 2020-12-22 NOTE — L&D DELIVERY NOTE
Delivery Note    Obstetrician:  Chuy Singletary MD    Pre-Delivery Diagnosis: Term pregnancy, Induced labor, Single fetus and Pregnancy complicated by: Gestational diabetes on insulin, AMA, asthma, Rh neg s/p rhogam    Post-Delivery Diagnosis: Living  infant(s) and Female \"Consuelo\"    Intrapartum Event: None    Procedure: Spontaneous vaginal delivery    Epidural: YES    Monitor:  Fetal Heart Tones - External and Uterine Contractions - External    Indications for instrumental delivery: none    Estimated Blood Loss: QBL pending at the time of completion of delivery summary    Episiotomy: none    Laceration(s):  2nd degree    Laceration(s) repair: YES    Presentation: Cephalic    Fetal Description: sosa    Fetal Position: Left Occiput Anterior    BABY INFORMATION  NAME:   Information for the patient's :  Ramon Evlis [[id number]]   SUSAN Nicole     SEX: female  DATE AND TIME OF BIRTH:   Information for the patient's :  Ramon Leal [[id number]]   2020    at   Information for the patient's :  Ramon Leal [[id number]]   [facility] [address]:   Information for the patient's :  Ramon Leal [[id number]]       and   Information for the patient's :  Ramon Leal [[id number]]      . APGARS:  Information for the patient's :  Ramon Leal [[id number]]         Information for the patient's :  Luis Erafita Leal [[id number]]          Umbilical Cord: 3 vessels present and Cord blood sent to lab for type, Rh, and Terra' test    Specimens: cord blood was sent. The placenta was disposed           Complications:  none           Lab Results   Component Value Date/Time    ABO/Rh(D) O NEGATIVE 2020 08:21 PM    Antibody screen NEG 2020 08:21 PM    GrBStrep, External positive  08/15/2016            Attending Attestation: I performed the procedure. No dystocia was encountered.  Pt delivered precipitously in the bed shortly after epidural was placed. MD in room and gowned, delivered the head, shoulders and torso followed without difficulty. Infant placed on maternal abdomen and delayed cord clamping was performed. After this, the cord was doubly clamped, the father of the baby cut the umbilical cord and then cord blood was drawn. The placenta delivered spontaneously and IV pitocin was started just prior to that. The uterus was firm with bimanual massage and IV pitocin. Lidocaine was injected into the perineum for perineal repair which was performed with a 3-0 monocryl suture in the usual fashion. Mother and infant remained in her labor room in stable postpartum condition.   -Elaine Xiao MD [facility]             Delivery Summary    Patient: Isabella Day MRN: [id number]  SSN: [id number]    YOB: 1985  Age: 28 y.o. Sex: female       Information for the patient's :  Renelda Bosworth [[id number]]       Labor Events:    Labor: No    Steroids: None   Cervical Ripening Date/Time:       Cervical Ripening Type: Misoprostol   Antibiotics During Labor: No   Rupture Identifier:      Rupture Date/Time: 2020 9:05 AM   Rupture Type: SROM   Amniotic Fluid Volume:  Moderate    Amniotic Fluid Description: Meconium    Amniotic Fluid Odor: None    Induction: Prostaglandins       Induction Date/Time: 2020      Indications for Induction: Diabetes    Augmentation: None   Augmentation Date/Time:      Indications for Augmentation:     Labor complications: None       Additional complications:        Delivery Events:  Indications For Episiotomy:     Episiotomy: None   Perineal Laceration(s): 2nd   Repaired: Yes   Periurethral Laceration Location:      Repaired:     Labial Laceration Location:     Repaired:     Sulcal Laceration Location:     Repaired:     Vaginal Laceration Location:     Repaired:     Cervical Laceration Location:     Repaired:     Repair Suture: Monocryl 3-0   Number of Repair Packets: 1   Estimated Blood Loss (ml):  ml   Quantitative Blood Loss (ml)                Delivery Date: 2020    Delivery Time: 9:54 AM  Delivery Type: Vaginal, Spontaneous  Sex:  Female    Gestational Age: 38w11d   Delivery Clinician:  Erwin Schneider  Living Status: Living   Delivery Location: L&D            APGARS  One minute Five minutes Ten minutes   Skin color: 1   1        Heart rate: 2   2        Grimace: 2   2        Muscle tone: 2   2        Breathin   2        Totals: 9   9            Presentation: Vertex    Position: Left Occiput Anterior  Resuscitation Method:  Tactile Stimulation     Meconium Stained: Thin      Cord Information: 3 Vessels  Complications: None  Cord around:    Delayed cord clamping? Yes  Cord clamped date/time:   Disposition of Cord Blood: Lab    Blood Gases Sent?: No    Placenta:  Date/Time: 2020  9:58 AM  Removal: Spontaneous      Appearance: Normal;Intact     [place] Measurements:  Birth Weight: 6 lb 11.2 oz (3.04 kg)      Birth Length: 50.7 cm      Head Circumference: 33 cm      Chest Circumference: 32.5 cm     Abdominal Girth: Other Providers:   Alexandria CARR;YECENIA GALARZA;;MING FERNANDEZ, Obstetrician;Primary Nurse;Primary [place] Nurse;Scrub Tech           Group B Strep:   Lab Results   Component Value Date/Time    GrBStrep, External positive  08/15/2016     Information for the patient's :  Seema Cruz [[id number]]   No results found for: ABORH, PCTABR, PCTDIG, BILI, ABORHEXT, ABORH     No results for input(s): PCO2CB, PO2CB, HCO3I, SO2I, IBD, PTEMPI, SPECTI, PHICB, ISITE, IDEV, IALLEN in the last 72 hours.

## 2020-12-22 NOTE — PROGRESS NOTES
8473: Anesthesia at bedside for epid. Placement  0930: Bps initiated. IV Fluid started. 0206: Timeout performed  0355: Test dose given  0940: Pt tolerated well.  Pt positioned to left tilt

## 2020-12-22 NOTE — H&P
History & Physical    Name: Fatmata Berry MRN: [id number]  SSN: [id number]    YOB: 1985  Age: 28 y.o. Sex: female      Subjective:     Estimated Date of Delivery: 20  OB History    Para Term  AB Living   4 2 2 0 1 2   SAB TAB Ectopic Molar Multiple Live Births   0 0 1 0 0 2      # Outcome Date GA Lbr Yusuf/2nd Weight Sex Delivery Anes PTL Lv   4 Current            3 Term 16 38w0d 10:15 / 00:17 7 lb 11.8 oz (3.51 kg) F Vag-Spont EPIDURAL AN N ANNAMARIE   2 Ectopic 2015 5w0d          1 Term 12   7 lb 2 oz (3.232 kg) F Vag-Spont   ANNAMARIE       Ms. Lili Reyes is admitted last evening with pregnancy at 39w5d for induction of labor due to gestational diabetes managed with insulin. Prenatal course was complicated by AMA, anemia, asthma, and as noted below in [address] and prenatal record. Please see prenatal records for details.     Past Medical History:   Diagnosis Date    Anemia     Asthma     Ectopic pregnancy     Family history of ovarian cancer 2020    History of epilepsy     as a child    History of infertility     Rh negative state in antepartum period      Past Surgical History:   Procedure Laterality Date    HX OOPHORECTOMY  2015    cauterization of right ovary    HX SALPINGO-OOPHORECTOMY Left 2002     Social History     Occupational History    Not on file   Tobacco Use    Smoking status: Never Smoker    Smokeless tobacco: Never Used   Substance and Sexual Activity    Alcohol use: No     Alcohol/week: 0.0 standard drinks    Drug use: Never    Sexual activity: Yes     Partners: Male     Birth control/protection: None     Family History   Problem Relation Age of Onset    Asthma Mother    Catie Burroughs Bladder Disease Mother     Hypertension Mother     Other Mother         fibromyalgia    Thyroid Disease Mother         hypothyroidism    Ovarian Cancer Mother     Ovarian Cancer Sister    Catie Burroughs Bladder Disease Sister     Thyroid Disease Sister hypothyroidism    Diabetes Maternal Grandfather     Diabetes Niece        Allergies   Allergen Reactions    Penicillins Hives     Prior to Admission medications    Medication Sig Start Date End Date Taking? Authorizing Provider   insulin glargine (Lantus Solostar U-100 Insulin) 100 unit/mL (3 mL) inpn Patient had reaction to Levemir. May substitute basgalar  Indications: diabetes during pregnancy 12/8/20  Yes Kaylah Tomlin MD   budesonide (Pulmicort Flexhaler) 180 mcg/actuation aepb inhaler Take  by inhalation. Yes Provider, Historical   insulin detemir U-100 (Levemir FlexTouch U-100 Insuln) 100 unit/mL (3 mL) inpn 6-25 units as directed twice daily; May substitute Lantus and Basaglar 10/14/20  Yes Lele Donovan MD   Insulin Needles, Disposable, (Unifine Pentips) 32 gauge x 5/32\" ndle 1 Pen Needle by Does Not Apply route four (4) times daily. 10/14/20  Yes Lele Donovan MD   Iron Fum & PS Cmp-Vit C-Niacin (Integra) 125-40-3 mg cap Take 1 Cap by mouth two (2) times a day. Indications: anemia from inadequate iron 10/14/20  Yes Lele Donovan MD   Blood-Glucose Meter monitoring kit Use as directed to check blood sugars 4x daily - fasting and 1 hour after each meal (Breakfast, Lunch, Dinner) 10/5/20  Yes Mayra Martinez MD   lancets (OneTouch UltraSoft Lancets) misc Use as directed to check blood sugars 4x daily - fasting and 1 hour after each meal (breakfast, lunch, dinner) 10/5/20  Yes Mayra Martinez MD   glucose blood VI test strips (ASCENSIA AUTODISC VI, ONE TOUCH ULTRA TEST VI) strip Use to check blood sugars 4x daily - fasting and 1 hr after each meal (breakfast, lunch, dinner) 10/5/20  Yes Mayra Martinez MD   montelukast (Singulair) 10 mg tablet Take 10 mg by mouth daily. Yes Provider, Historical   PNV BL.98/PSCGPIF fum/folic ac (PRENATAL PO) Take  by mouth. Yes Provider, Historical   cetirizine (ZYRTEC) 10 mg tablet Take 10 mg by mouth.  10/30/18  Yes Provider, Historical albuterol (PROVENTIL HFA, VENTOLIN HFA, PROAIR HFA) 90 mcg/actuation inhaler Take 2 Puffs by inhalation. Provider, Historical        Review of Systems: A comprehensive review of systems was negative except for that written in the History of Present Illness. Objective:     Vitals:  Vitals:    12/22/20 0957 12/22/20 1003 12/22/20 1004 12/22/20 1006   BP: (!) 224/151 116/68 116/68 (!) 108/53   Pulse: (!) 111 87  81   Resp:       Temp:   97.7 °F (36.5 °C)    ( BPs above are inaccurate - taken while pushing) See BPs in VS from admission    Physical Exam:  Deferred - see RN exam upon admission  Membranes:  Intact  Fetal Heart Rate: Reactive          Prenatal Labs:   Lab Results   Component Value Date/Time    ABO/Rh(D) O NEGATIVE 12/21/2020 08:21 PM    GrBStrep, External positive  08/15/2016       Impression/Plan:     Principal Problem:    39 weeks gestation of pregnancy (12/21/2020)    Active Problems:    GDM (gestational diabetes mellitus) (12/21/2020)      Encounter for induction of labor (12/21/2020)         Plan: Admit for induction of labor. Group B Strep negative. IOL Counseling:     Factual information regarding the medical condition and the need for induction of labor was discussed with the patient, who verbalized understanding. The therapeutic rationale, benefits, risks and potential complications were discussed, including the possibility of pain, bleeding, infection, loss of function, disfigurement, death or other unforeseen complications. Alternatives to the procedure were discussed (including potential risks, complications and benefits of each). No guarantee was expressed or implied as to the results of the procedure. Informed consent was given, as well as a request to proceed.

## 2020-12-22 NOTE — LACTATION NOTE
In to see mom and infant for the first time. Experienced mom stated that infant has sucked very little but has been latching. Mom has her skin to skin on her chest. Reviewed expectations of the first 24 hours and informed mom that if infant's blood sugar drops we will start her pumping.

## 2020-12-22 NOTE — ANESTHESIA PREPROCEDURE EVALUATION
Relevant Problems   No relevant active problems       Anesthetic History   No history of anesthetic complications            Review of Systems / Medical History  Patient summary reviewed and pertinent labs reviewed    Pulmonary            Asthma : well controlled       Neuro/Psych             Comments: Epilepsy as a child - currently not on meds Cardiovascular  Within defined limits                     GI/Hepatic/Renal                Endo/Other    Diabetes (gestational )    Obesity     Other Findings              Physical Exam    Airway  Mallampati: III  TM Distance: 4 - 6 cm  Neck ROM: normal range of motion   Mouth opening: Normal     Cardiovascular    Rhythm: regular  Rate: normal         Dental  No notable dental hx       Pulmonary  Breath sounds clear to auscultation               Abdominal  GI exam deferred       Other Findings            Anesthetic Plan    ASA: 2  Anesthesia type: epidural            Anesthetic plan and risks discussed with: Patient and Spouse

## 2020-12-22 NOTE — PROGRESS NOTES
Nutrition:  Best Practice Alert received for GDM. Diet: DIET NPO With Ice Chips    Patient is admitted for induction. Will defer assessment as per standard of care.     Electronically signed by Jovita Kaye MS, RD, LD on 12/22/2020 at 8:40 AM.  Contact: [phone]

## 2020-12-22 NOTE — PROGRESS NOTES
Full assessment done. Pt lying on left side sleeping. V/s wnl. 0/10 pain at this time. No lof or bleeding noted or reported. POC discussed with pt. Will start Pitocin at 0900.

## 2020-12-22 NOTE — LACTATION NOTE

## 2020-12-22 NOTE — ANESTHESIA POSTPROCEDURE EVALUATION
* No procedures listed *. No value filed.     Anesthesia Post Evaluation      Multimodal analgesia: multimodal analgesia used between 6 hours prior to anesthesia start to PACU discharge  Patient location during evaluation: floor  Patient participation: complete - patient participated  Level of consciousness: awake and alert  Pain management: adequate  Airway patency: patent  Anesthetic complications: no  Cardiovascular status: acceptable  Respiratory status: acceptable  Hydration status: acceptable  Post anesthesia nausea and vomiting:  controlled  Final Post Anesthesia Temperature Assessment:  Normothermia (36.0-37.5 degrees C)      INITIAL   Visit Vitals  /75 (BP 1 Location: Right arm, BP Patient Position: Sitting)   Pulse 100   Temp 36.7 °C (98.1 °F)   Resp 16   Breastfeeding Yes

## 2020-12-22 NOTE — PROGRESS NOTES
SBAR OUT Report: Mother    Verbal report given to Mega Arceo RN (full name & credentials) on this patient, who is now being transferred to MIU (unit) for routine progression of care. The patient is not wearing a green \"Anesthesia-Duramorph\" band. Report consisted of patient's Situation, Background, Assessment and Recommendations (SBAR). [place] ID bands were compared with the identification form, and verified with the patient and receiving nurse. Information from the SBAR/Kardex and the [place] Report was reviewed with the receiving nurse; opportunity for questions and clarification provided.

## 2020-12-23 LAB
GLUCOSE BLD STRIP.AUTO-MCNC: 87 MG/DL (ref 65–100)
GLUCOSE BLD STRIP.AUTO-MCNC: 87 MG/DL (ref 65–100)

## 2020-12-23 PROCEDURE — 82962 GLUCOSE BLOOD TEST: CPT

## 2020-12-23 PROCEDURE — 65270000029 HC RM PRIVATE

## 2020-12-23 PROCEDURE — 2709999900 HC NON-CHARGEABLE SUPPLY

## 2020-12-23 PROCEDURE — 74011250637 HC RX REV CODE- 250/637: Performed by: OBSTETRICS & GYNECOLOGY

## 2020-12-23 PROCEDURE — 74011250636 HC RX REV CODE- 250/636: Performed by: OBSTETRICS & GYNECOLOGY

## 2020-12-23 RX ADMIN — IBUPROFEN 800 MG: 800 TABLET ORAL at 20:23

## 2020-12-23 RX ADMIN — IBUPROFEN 800 MG: 800 TABLET ORAL at 13:38

## 2020-12-23 RX ADMIN — RHO(D) IMMUNE GLOBULIN (HUMAN) 0.3 MG: 1500 SOLUTION INTRAMUSCULAR at 18:03

## 2020-12-23 RX ADMIN — IBUPROFEN 800 MG: 800 TABLET ORAL at 05:29

## 2020-12-23 NOTE — DISCHARGE INSTRUCTIONS
Patient Education        Vaginal Childbirth: Care Instructions  Overview     Vaginal birth means delivering a baby through the birth canal (vagina). During labor, the uterus tightens (contracts) regularly to thin and open the cervix and to push the baby out through the birth canal.  Your body will slowly heal in the next few weeks. It's easy to get too tired and overwhelmed during the first weeks after your baby is born. Changes in your hormones can shift your mood without warning. You may find it hard to meet the extra demands on your energy and time. Take it easy on yourself. Follow-up care is a key part of your treatment and safety. Be sure to make and go to all appointments, and call your doctor if you are having problems. It's also a good idea to know your test results and keep a list of the medicines you take. How can you care for yourself at home? Vaginal bleeding and cramps  · After delivery, you will have a bloody discharge from your vagina. This will turn pink within a week and then white or yellow after about 10 days. It may last for 2 to 4 weeks or longer, until the uterus has healed. Use pads instead of tampons until you stop bleeding. · Don't worry if you pass some blood clots, as long as they are smaller than a golf ball. If you have a tear or stitches in your vaginal area, change the pad at least every 4 hours. This will help prevent soreness and infection. · You may have cramps for the first few days after childbirth. These are normal and occur as the uterus shrinks to normal size. Take an over-the-counter pain medicine, such as acetaminophen (Tylenol), ibuprofen (Advil, Motrin), or naproxen (Aleve), for cramps. Read and follow all instructions on the label. Do not take aspirin, because it can cause more bleeding. · Do not take two or more pain medicines at the same time unless the doctor told you to. Many pain medicines have acetaminophen, which is Tylenol.  Too much acetaminophen (Tylenol) can be harmful. Stitches  · If you have stitches, they will dissolve on their own and don't need to be removed. Follow your doctor's instructions for cleaning the stitched area. · Put ice or a cold pack on your painful area for 10 to 20 minutes at a time, several times a day, for the first few days. Put a thin cloth between the ice and your skin. · Sit in a few inches of warm water (sitz bath) 3 times a day and after bowel movements. The warm water helps with pain and itching. If you don't have a tub, a warm shower might help. Breast fullness  · Your breasts may overfill (engorge) in the first few days after delivery. To help milk flow and to relieve pain, warm your breasts in the shower or by using warm, moist towels before nursing. · If you aren't nursing, don't put warmth on your breasts or touch your breasts. Wear a tight bra or sports bra and use ice until the fullness goes away. This usually takes 2 to 3 days. · Put ice or a cold pack on your breast after nursing to reduce swelling and pain. Put a thin cloth between the ice and your skin. Activity  · Eat a balanced diet. Don't try to lose weight by cutting calories. Keep taking your prenatal vitamins, or take a multivitamin. · Get as much rest as you can. Try to take naps when your baby sleeps during the day. · Get some exercise every day. But don't do any heavy exercise until your doctor says it is okay. · Wait until you are healed (about 4 to 6 weeks) before you have sexual intercourse. Your doctor will tell you when it is okay to have sex. · Talk to your doctor about birth control. You can get pregnant even before your period returns. Also, you can get pregnant while you are breastfeeding. Mental health  · It's normal to have some sadness, anxiety, sleeplessness, and mood swings after you go home. If you feel upset or hopeless for more than a few days or are having trouble doing the things you need to do, talk to your doctor.   Constipation and hemorrhoids  · Drink plenty of fluids, enough so that your urine is light yellow or clear like water. If you have kidney, heart, or liver disease and have to limit fluids, talk with your doctor before you increase the amount of fluids you drink. · Eat plenty of fiber each day. Have a bran muffin or bran cereal for breakfast. Try eating a piece of fruit for a mid-afternoon snack. · For painful, itchy hemorrhoids, put ice or a cold pack on the area several times a day for 10 minutes at a time. Follow this by putting a warm compress on the area for another 10 to 20 minutes or by sitting in a shallow, warm bath. When should you call for help? Call  911 anytime you think you may need emergency care. For example, call if:    · You have thoughts of harming yourself, your baby, or another person.     · You passed out (lost consciousness).     · You have chest pain, are short of breath, or cough up blood.     · You have a seizure. Call your doctor now or seek immediate medical care if:    · You have severe vaginal bleeding.     · You are dizzy or lightheaded, or you feel like you may faint.     · You have a fever.     · You have new or more pain in your belly or pelvis.     · You have symptoms of a blood clot in your leg (called a deep vein thrombosis), such as:  ? Pain in the calf, back of the knee, thigh, or groin. ? Redness and swelling in your leg or groin.     · You have signs of preeclampsia, such as:  ? Sudden swelling of your face, hands, or feet. ? New vision problems (such as dimness, blurring, or seeing spots). ? A severe headache. Watch closely for changes in your health, and be sure to contact your doctor if:    · Your vaginal bleeding seems to be getting heavier.     · You have new or worse vaginal discharge.     · You feel sad, anxious, or hopeless for more than a few days.     · You do not get better as expected. Where can you learn more?   Go to http://www.gray.com/  Enter F451 in the search box to learn more about \"Vaginal Childbirth: Care Instructions. \"  Current as of: February 11, 2020               Content Version: 12.6  © 8983-9025 [employer]. Care instructions adapted under license by [employer] (which disclaims liability or warranty for this information). If you have questions about a medical condition or this instruction, always ask your healthcare professional. [place] [address] any warranty or liability for your use of this information.

## 2020-12-23 NOTE — PROGRESS NOTES
Progress Note                               Patient: Taylor Galindo MRN: [id number]  SSN: [id number]    YOB: 1985  Age: 28 y.o. Sex: female      Postpartum Day Number 1    Subjective:     Patient doing well postpartum without significant complaints. Voiding without difficulty. Patient reports normal lochia. . Breastfeeding: Yes     Objective:     Patient Vitals for the past 18 hrs:   Temp Pulse Resp BP SpO2   20 0700 97.6 °F (36.4 °C) 64 16 115/76 96 %   20 1950 98.4 °F (36.9 °C) 80 20 126/76 98 %        Temp (24hrs), Av °F (36.7 °C), Min:97.6 °F (36.4 °C), Max:98.4 °F (36.9 °C)      Physical Exam:    General:   Patient without distress. Abdomen: Soft, fundus firm at level of umbilicus, nontender   Lower Extremities: Negative for swelling, cords, or tenderness. Lab/Data Review:  CBC:    Recent Labs     20   WBC 8.6   HGB 12.4   HCT 38.8        Blood Type:   Lab Results   Component Value Date/Time    ABO/Rh(D) O NEGATIVE 2020 08:21 PM      Infant's Blood Type: Information for the patient's :  Rafael Lorna [[id number]]     Lab Results   Component Value Date/Time    ABO/Rh(D) A POSITIVE 2020 09:54 AM          Assessment and Plan:     Doing well  Discontinue glucose monitoring.  Plan 2hr at 6 wks check  Needs rhogam prior to discharge    Rafiq Grace MD  12:27 PM

## 2020-12-23 NOTE — LACTATION NOTE
This note was copied from a baby's chart. Lactation visit. Baby not latching per mom report, attempting but not latching. Mom pumping. Averaging normal colostrum volumes so far, pumping about ~1.5ml each session. Reassured mom about normal volume. Keep pumping every 3 hours if baby doesn't latch. Give all colostrum. Supplementing formula due to initial low blood glucose and baby now with high risk bilirubin and under phototherapy. Discussed intake needs. Try for 15ml total milk volume per feeding now that baby over 24 hours old and with high bilirubin levels. Has not been feeding but 3-5ml per feed as Dad reports baby shows limited interest or suck skill even on finger with syringe. Feeding baby now. Reviewed swaddling and better supportive hold for finger feeding via curved syringe. Did fair, took 18ml total. Best feeding volume since birth. Will monitor feeds closely, gave slow flow nipples to try as needed. Questions answered. Offered lactation assistance today as needed.

## 2020-12-23 NOTE — LACTATION NOTE
This note was copied from a baby's chart. Individualized Feeding Plan for Breastfeeding   Lactation Services [phone]      As much as possible, hold your baby on your chest so babys bare skin is against your bare skin with a blanket covering babys back, especially 30 minutes before it is time for baby to eat. Watch for early feeding cues such as, licking lips, sucking motions, rooting, hands to mouth. Crying is a late feeding cue. Feed your baby at least 8 times in 24 hours, or more if your baby is showing feeding cues. If baby is sleepy put baby skin to skin and watch for hunger cues. To rouse baby: unwrap, undress, massage hands, feet, & back, change diaper, gently change babys position from lying to sitting. 15-20 minutes on the first breast of active breastfeeding is considered a good feeding. Good, active breastfeeding is when baby is alert, tugging the nipple, their ear may move, and you can hear swallows. Allow baby to finish the first side before changing sides. Sleeping at the breast or only brief, light sucks should not be considered a good, full breastfeed. At each feeding:  __x__1. Do Suck Practice on finger before each feeding until sucking pattern is smooth. Try using index finger. Nail down towards tongue. __x__2. Hand Express for a few minutes prior to latching to help start milk flow. __x__3. Baby needs to NURSE WELL x 15-20 minutes on at least first breast, burp and offer 2nd breast at every feeding. If no sustained latch only attempt at breast for 10 minutes. If baby does not latch on and feed well on at least one side, you should:   __x__4. Double pump for 15 minutes with breast massage and compression. Hand express for an additional 2-3 minutes per side. Pump after each feeding attempt or poor feeding, up to 8 times per day. If you are not putting baby to the breast you need to pump 8 times a day. Pump every 3 hours. __x__5.  Give baby all of the breast milk you obtain using a straight syringe or  curved syringe. If baby does NOT have enough wet and dirty diapers per day, is jaundiced/lethargic, or has significant weight loss AND you do NOT pump enough milk for each feeding (per volume listed below), formula supplementation may need to be used. Call lactation department /pediatrician if you have concerns. AVERAGE INTAKES OF COLOSTRUM BY HEALTHY  INFANTS:  Time  Day Intake (ml per feeding)  Based on 8 feedings per day. 1st 24 hrs  1 2-10 ml  24-48 hrs  2 5-15 ml  48-72 hrs  3 15-30 ml (0.5-1 oz)  72-96 hrs  4 30-45 ml (1-1.5oz)                          5-6       45-60 ml (1.5-2oz)                           7          60ml+ (2oz+)    By day 7, baby will need 60 ml or 2 oz at each feeding based on 8 feedings per day & babys weight. (1oz = 30ml). Total milk volume needed in 24 hours by Day 7 is 16-18 oz per day based on baby's birthweight of 6-11. The more often baby eats, the less volume they need per feeding. If baby is eating more often than the minimum of 8 times per day, they may take less per feeding. Comments: If pumping, suggest using olive oil or coconut oil on your nipples before pumping to help reduce the friction. Use feeding plan until follow up with pediatrician. Continue to attempt at the breast for most feeds. Pump every 3 hours if no latch. Give all pumped colostrum/breastmilk at each feeding. OUTPATIENT APPOINTMENT Suggested. Outpatient services are located on the 4th floor at [address] [place] [address]. Check in at the 4th floor registration desk (the same one you used when you came to have your baby).   Call for questions [phone]

## 2020-12-23 NOTE — ADDENDUM NOTE
Addendum  created 12/23/20 1317 by Joshua Nichole CRNA    Flowsheet accepted, Intraprocedure Flowsheets edited

## 2020-12-23 NOTE — PROGRESS NOTES
Chart reviewed - no needs identified. SW met with patient/ while social distancing. Patient denies any history of postpartum depression/anxiety. Patient given informational packet on  mood & anxiety disorders (resources/education). Family denies any additional needs from  at this time. Family has 's contact information should any needs/questions arise.     MARGOT Paul-CP  [facility]

## 2020-12-24 VITALS
OXYGEN SATURATION: 98 % | RESPIRATION RATE: 16 BRPM | DIASTOLIC BLOOD PRESSURE: 73 MMHG | TEMPERATURE: 98 F | HEART RATE: 78 BPM | SYSTOLIC BLOOD PRESSURE: 127 MMHG

## 2020-12-24 PROCEDURE — 2709999900 HC NON-CHARGEABLE SUPPLY

## 2020-12-24 PROCEDURE — 74011250637 HC RX REV CODE- 250/637: Performed by: OBSTETRICS & GYNECOLOGY

## 2020-12-24 RX ORDER — IBUPROFEN 600 MG/1
600 TABLET ORAL
Qty: 60 TAB | Refills: 0 | Status: SHIPPED | OUTPATIENT
Start: 2020-12-24 | End: 2021-10-06 | Stop reason: ALTCHOICE

## 2020-12-24 RX ADMIN — IBUPROFEN 800 MG: 800 TABLET ORAL at 06:28

## 2020-12-24 NOTE — LACTATION NOTE
In to see mom and infant for discharge. Mom states baby still not latching well. She has been pumping and giving back breast milk and formula as well. Mom already had feeding plan in hand yesterday and states comfortable with how to use it for guidance at home. Encouraged to call outpatient lactation as needed once at home. Reviewed discharge info and how to manage period of engorgement. No further needs at this time.

## 2020-12-24 NOTE — LACTATION NOTE
Mom and baby are going home today. Continue to offer the breast without restriction. Mom's milk should be fully in over the next few days. Reviewed engorgement precautions. Hand Expression has been demoed and written hand-out reviewed. As milk comes in baby will be more alert at the breast and swallows will be more obvious. Breasts may feel softer once baby has finished nursing. Baby should be back to birth weight by 3weeks of age. And then gain on average 1 oz per day for the next 2-3 months. Reviewed babies should be exclusively breastfeeding for the first 6 months and that breastfeeding should continue after introduction of appropriate complimentary foods after 6 months. Initial output should be at least 1 wet and 1 bowel movement for each day old baby is. By day 5-7 once milk is fully in baby will consistently have 6 or more soaking wet diapers and about 4 bowel movement. Some babies have a bowel movement with every feeding and some have 1-3 large bowel movements each day. Inadequate output may indicate inadequate feedings and should be reported to your Pediatrician. Bowel habits may change as baby gets older. Encouraged follow-up at Pediatrician in 1-2 days, no later than 1 week of age. Call [facility] for any questions as needed or to set up an OP visit. OP phone calls are returned within 24 hours. Community Breastfeeding Resource List given.

## 2020-12-24 NOTE — PROGRESS NOTES
Patient is S/P vaginal delivery at 44 5/7 weeks, IOL for GDM. No complaints today. Lochia < menses. No GI/ issues. No F/C. VITALS  Patient Vitals for the past 24 hrs:   Temp Pulse Resp BP SpO2   20 0729 98 °F (36.7 °C) 78 16 127/73 98 %   20 98 °F (36.7 °C) 86 17 134/76 98 %        CV - RRR  LUNGS - CTA bilaterally  ABD - soft, approp tend, FF below umbilicus  EXT - tr edema bilaterally          Labs:    Recent Results (from the past 24 hour(s))   GLUCOSE, POC    Collection Time: 20 11:17 AM   Result Value Ref Range    Glucose (POC) 87 65 - 100 mg/dL         PPD #2      Pt is breast feeding. Stable.   Routine PP instructions      Krista Hutchins MD  9:55 AM  20

## 2020-12-29 ENCOUNTER — HOSPITAL ENCOUNTER (OUTPATIENT)
Dept: LACTATION | Age: 35
Discharge: HOME | End: 2020-12-29
Payer: COMMERCIAL

## 2020-12-29 PROCEDURE — 99213 OFFICE O/P EST LOW 20 MIN: CPT

## 2020-12-29 NOTE — LACTATION NOTE
Outpatient Feeding Plan for Breastfeeding  [phone]  Patient: Zoila Wallis  Gestational Age: 41w 5d  Diagnosis:  V 24.1/Z39.1 Not latching  [facility]   Start Time:  7244  Stop Time:  1145    Good, active breastfeeding is when baby is alert, tugging the nipple in long, deep pulls, and you can hear swallows every 1-2 sucks. By now Mom's milk should be in. Brief, light or shallow sucks or short rapid sucks without frequent swallows should not be considered a full breastfeeding. 1. Complete the following mouth exercises prior to feeding:  Gum Massage, Cheek Stretch, Lip Stretch and Non-nutritive Sucking    2. Put the baby to the breast 2-4 times per day for 15 minutes per side. Finish first side before offering other side. As nursing is going well, add in additional feedings at the breast.  One additional feeding every few days until fully nursing. Use: Nipple Shield, Breast Compression and Breast Massage    3. Assume baby is getting 15 ml if nursing well for 15 minutes. Supplement remaining volume after breastfeeding. Supplement your baby by bottle with 30-45 ml/ 1-1.5 oz of breast milk/formula: 30ml = 1oz. Position the baby upright to bottle feed. Ensure the nipple is all the way in the baby's mouth and lips are flanged around the bottle. Continue with curved tip syringe for now. 4. Pump for 5-10 minutes after nursing. Try to pump within 15 minutes of breastfeeding. Try top pump after as many daytime feedings as able. Be consistent. 5. If pumping and syringe/bottle feeding, give baby at least 60 ml/2 oz of breast milk/formula and double pump with massage and compression for 15 minutes. Estimated Needs:  Baby needs 16-18 oz of breast milk/formula per day based on 8 feedings per day. Baby needs at least 60 ml/2 oz of breast milk/formula per feeding.   The more often baby eats the less volume they need per feeding. Date Weight Comments   12-22-20 6-11 Birthweight   12-24-20 6-5.5 Discharge Weight   12-28-20 6-8 At [facility]   12-29-20 6-7.7 Naked At [facility] OP Lactation      Average weight gain for the first 3 months is 1oz per day. Minimum weight gain in the first 3 months is 0.5 oz per day. Typically regaining to birth weight by 2 weeks. Date Side Position Time Before Wt. After Wt Total   12-29-20 L* Cross cradle  1058 17 min  2956 2972 16 ml     R*     Attempt   *medium nipple shield. Handouts given:Tongue Tie    Baby last ate at 0815. Took 60 ml by curved tip syringe. Mom pumped 6 oz at 0815. Typically gets 3 oz each. Was 2 on L and 4 on R today. Pumped 4 oz at 4 am.  Soft bodied. Scott Smith was alert and ready to nurse. Mom has been struggling to get her to latch. Attempted at breast, but unable to stay on. Mom has an abundant milk supply. On digital assessment suck seems inefficient. Many markers for restricted oral tissue. Suggested possible option of trying with a nipple shield to achieve latch. Mom interested. Applied medium shield and Consuelo latched on and stayed on easily. Laryngomalacia sounds throughout feeding. Mom reports with syringe feeding she is also squeaking. Scott Smith was very active at the breast.  Unfortunately she was not overly efficient and only transferred 0.5 oz. She was tired and did not really attempt on the R. Will follow milk supply, weight gain and efficiency at breast. Since mom will be triple feeding, suggest starting with 2-4 feeds per day at breast.  Will need to continue pumping and offering additional milk afterwards. Noted mom considering consult for release of tethered oral tissue. Asked for information to be sent to a qualified provider. Baby's    Oral Digital Assessment:  Tongue behind gum with sucking. Rarely comes over gum. High bubble palate. Upper labial frenulum is thick and wide.   Upper gum is slightly lifted at attachment. Lip does not reach nose. Laryngomalacia. Output:  Soaking wets. Yellow, runny, seedy, frequent stools. Mom's    Nipples:  Wide, everted. Fills 24 mm shield. Milk in shield at end of feeding. Breasts:  Large. Plan:  Continue with on-demand feeding. Put to breast with a nipple shield as needed 2-4 times per day. Will need to continue to offer extra milk after nursing while working on nursing efficiency. Will also continue to need to pump. Suggest following up on tongue and lip restrictions and evaluation for release. Follow-up: To Ped for 2 months. Will call Tuesday 1-5-21. Call as needed before.

## 2020-12-29 NOTE — LACTATION NOTE
2020  Re: Lucy Shook  20)    Dear Dr. Kirill Spears,     I saw [facility] and her mother Coleolimpia Singletary in our Lactation office today. Mom came in today to get assistance with breastfeeding. Date Weight Comments   20 6-11 Birthweight   20 6-5.5 Discharge Weight   20 6-8 At [facility]   20 6-7.7 Naked At [facility] OP Lactation      Estimated Needs:  Baby needs 16-18 oz of breast milk/formula per day based on 8 feedings per day. Baby needs at least 60 ml/2 oz of breast milk/formula per feeding. The more often baby eats the less volume they need per feeding. Date Side Position Time Before Wt. After Wt Total   20 L* Cross cradle  1058 17 min  2956 2972 16 ml     R*     Attempt   *medium nipple shield. Neltsergio Estimable was alert and ready to nurse. Mom has been struggling to get her to latch. Attempted at breast, but unable to stay on. Mom has an abundant milk supply. On digital assessment suck seems inefficient. Many markers for restricted oral tissue. Suggested possible option of trying with a nipple shield to achieve latch. Mom interested. Applied medium shield and Consuelo latched on and stayed on easily. Laryngomalacia sounds throughout feeding. Mom reports with syringe feeding she is also squeaking. [facility] was very active at the breast.  Unfortunately she was not overly efficient and only transferred 0.5 oz. She was tired and did not really attempt on the R. Will follow milk supply, weight gain and efficiency at breast. Since mom will be triple feeding, suggest starting with 2-4 feeds per day at breast.  Will need to continue pumping and offering additional milk afterwards. Noted mom considering consult for release of tethered oral tissue. Asked for information to be sent to a qualified provider. Plan:  Continue with on-demand feeding. Put to breast with a nipple shield as needed 2-4 times per day.   Will need to continue to offer extra milk after nursing while working on nursing efficiency. Will also continue to need to pump. Suggest following up on tongue and lip restrictions and evaluation for release.      Follow-up: To Ped for 2 months. Will call Tuesday 1-5-21. Call as needed before.     Sincerely,      Ramon Roberts [address], [address] [place] [address], [address] [place] [address]

## 2020-12-31 NOTE — DISCHARGE SUMMARY
Date of Admission:  2020  7:33 PM  Date of Discharge:  2020  1:22 PM    No diagnosis found. Ludwig Reid 28 y.o. [place]  presented at 39w5d for induction for GDM. Pt had  without incident. See delivery note for all delivery details. Pt's PP course was uneventful. On day of D/C, she was ambulating well, afebrile, with lochia < menses. She was discharged home with medications as below. Pt was breast feeding on discharge. Routine PP instructions given to patient. She is to follow up with [facility] in 6 weeks for PP exam.    Discharge Medication List as of 2020 10:02 AM      START taking these medications    Details   ibuprofen (MOTRIN) 600 mg tablet Take 1 Tab by mouth every six (6) hours as needed for Pain., Normal, Disp-60 Tab, R-0         CONTINUE these medications which have NOT CHANGED    Details   budesonide (Pulmicort Flexhaler) 180 mcg/actuation aepb inhaler Take  by inhalation. , Historical Med      Iron Fum & PS Cmp-Vit C-Niacin (Integra) 125-40-3 mg cap Take 1 Cap by mouth two (2) times a day. Indications: anemia from inadequate iron, Normal, Disp-60 Cap,R-4      montelukast (Singulair) 10 mg tablet Take 10 mg by mouth daily. , Historical Med      PNV IZ.27/PLOKXLY fum/folic ac (PRENATAL PO) Take  by mouth., Historical Med      cetirizine (ZYRTEC) 10 mg tablet Take 10 mg by mouth., Historical Med      albuterol (PROVENTIL HFA, VENTOLIN HFA, PROAIR HFA) 90 mcg/actuation inhaler Take 2 Puffs by inhalation. , Historical Med         STOP taking these medications       insulin glargine (Lantus Solostar U-100 Insulin) 100 unit/mL (3 mL) inpn Comments:   Reason for Stopping:         insulin detemir U-100 (Levemir FlexTouch U-100 Insuln) 100 unit/mL (3 mL) inpn Comments:   Reason for Stopping:         Insulin Needles, Disposable, (Unifine Pentips) 32 gauge x 5/32\" ndle Comments:   Reason for Stopping:         Blood-Glucose Meter monitoring kit Comments:   Reason for Stopping: lancets (OneTouch UltraSoft Lancets) misc Comments:   Reason for Stopping:         glucose blood VI test strips (ASCENSIA AUTODISC VI, ONE TOUCH ULTRA TEST VI) strip Comments:   Reason for Stopping:               Maggie Espitia MD  8:25 AM  12/31/20

## 2021-02-04 ENCOUNTER — HOSPITAL ENCOUNTER (OUTPATIENT)
Dept: LACTATION | Age: 36
Discharge: HOME | End: 2021-02-04

## 2021-02-04 NOTE — LACTATION NOTE
Outpatient Feeding Plan for Breastfeeding  [phone]  Patient: Zoë Roe  Gestational Age: 41w 5d  Diagnosis:  V 24.1/Z39.1 Not latching  [facility] [address] [facility]   Start Time:  21  (late check-in)  Stop Time:  1220    Good, active breastfeeding is when baby is alert, tugging the nipple in long, deep pulls, and you can hear swallows every 1-2 sucks. By now Mom's milk should be in. Brief, light or shallow sucks or short rapid sucks without frequent swallows should not be considered a full breastfeeding. 1. Complete the following mouth exercises prior to feeding:  Gum Massage, Lip Stretch and Non-nutritive Sucking    2. Put the baby to the breast 2 times per day for 15 minutes per side (if latching). Finish first side before offering other side. Try laid back or side lying position. Use: Nipple Shield, Breast Compression and Breast Massage    3. After breastfeeding, supplement your baby by bottle as needed of breast milk/formula: 30ml = 1oz. Position the baby upright to pace bottle feed. Ensure the nipple is all the way in the baby's mouth and lips are flanged around the bottle. 4. Pump for 5-10 minutes after nursing. Try to pump within 15 minutes of breastfeeding. Be consistent. 5. If pumping and bottle feeding, give baby at least 90 ml/3 oz of breast milk/formula and double pump with massage and compression for 15 minutes. Estimated Needs:  Baby needs 22-24 oz of breast milk/formula per day based on 8 feedings per day. Baby needs 90 ml/3 oz of breast milk/formula per feeding. The more often baby eats the less volume they need per feeding. Date Weight Comments   12-22-20 6-11 Birthweight   12-24-20 6-5.5 Discharge Weight   12-28-20 6-8 At [facility]   12-29-20 6-7.7 Naked At [address] OP Lactation    2-4-21 8-12 Naked At [address] OP Lactation   Gained 36 oz in 37 days.     Average weight gain for the first 3 months is 1oz per day. Minimum weight gain in the first 3 months is 0.5 oz per day. Typically regaining to birth weight by 2 weeks. Consuelo was alert and showing feeding cues. Mom has had limited success at the breast.  Occasionally Consuelo will nurse for up to 10 minutes about once per day with a nipple shield. Pumping and bottle feeding for most feedings. Good supply volume. Pumps 3-4 oz per side. Mom feels baby takes a long time to bottle feed as well. After multiple attempts at breast with and without the shield (24 and 20 mm), with milk flow, change in nipple shield size and stating with a bottle and switching, we did not achieve a latch. Switched to bottle feeding. Leaks with bottle. Laryngomalacia sounds with drinking. Tires before bottle is empty. Feeding follow up post tongue and lip tie release was delayed due to covid quarantine by 3 weeks. Raghu Castellanos seems to have some breast aversion. Discussed happy time at breast unrelated to feeding, which mom is doing. Although weight gain has been appropriate, feeding is a stressful time at breast and with bottle. Suggested mom discuss possible consult with OT or SLP for info on improving efficiency. Per mom, older sister sees an OT currently for sensory issues. Baby's    Oral Digital Assessment:  Tongue out and over gum. Bubble palate. Tongue and lip released 1-7-21 by Deanna. Output:  Soaking wets. Yellow, runny, seedy, frequent stools. Mom's    Nipples: Wider, everted. Breasts:  Large. Plan:  Continue to work on feedings at breast.  As stamina increases so should efficiency and ability at breast.  May want to ask about possible OT or SLP evaluation to help with strength and stamina for breast and bottle. Raghu Castellanos has had good weight gain, but efficiency with feeding could benefit from evaluation. Follow-up: To Ped 3-2-21. Will call 2-9-21. Call as needed before.

## 2021-02-04 NOTE — LACTATION NOTE
2021  Re: Kelsea Pereira  2020)    Dear Dr. Nany Lee,     I saw [facility] and her mother Ghazal Mcmahon in our Lactation office today. Mom came in for a follow up visit from tongue and lip tie release. Visit was delayed twice due to covid quarantining. Date Weight Comments   20 6-11 Birthweight   20 6-5.5 Discharge Weight   20 6-8 At [facility]   20 6-7.7 Naked At [facility] OP Lactation    21 8-12 Naked At [facility] OP Lactation   Gained 36 oz in 37 days. Estimated Needs:  Baby needs 22-24 oz of breast milk/formula per day based on 8 feedings per day. Baby needs 90 ml/3 oz of breast milk/formula per feeding. The more often baby eats the less volume they need per feeding. Baby Consuelo was alert and showing feeding cues. Mom has had limited success at the breast.  Occasionally Consuelo will nurse for up to 10 minutes about once per day with a nipple shield. Pumping and bottle feeding for most feedings. Good supply volume. Pumps 3-4 oz per side. Mom feels baby takes a long time to bottle feed as well. After multiple attempts at breast with and without the shield (24 and 20 mm), with milk flow, change in nipple shield size and stating with a bottle and switching, we did not achieve a latch. Switched to bottle feeding. Leaks with bottle. Laryngomalacia sounds with drinking. Tires before bottle is empty. Feeding follow up post tongue and lip tie release was delayed due to covid quarantine by 3 weeks. [facility] seems to have some breast aversion. Discussed happy time at breast unrelated to feeding, which mom is doing. Although weight gain has been appropriate, feeding is a stressful time at breast and with bottle. Suggested mom discuss possible consult with OT or SLP for info on improving efficiency. Per mom, older sister sees an OT currently for sensory issues.       Plan:  Continue to work on feedings at breast.  As stamina increases so should efficiency and ability at breast.  May want to ask about possible OT or SLP evaluation to help with strength and stamina for breast and bottle. Connie Kinsey has had good weight gain, but efficiency with feeding could benefit from evaluation.      Follow-up: To Ped 3-2-21. Will call 2-9-21. Call as needed before.     Sincerely,      Ramon Jauregui [address], [address] [place] [address], [address] [place] [address]

## 2021-02-23 ENCOUNTER — HOSPITAL ENCOUNTER (OUTPATIENT)
Dept: LACTATION | Age: 36
Discharge: HOME | End: 2021-02-23

## 2021-02-23 NOTE — LACTATION NOTE
2021  Re: Oswaldo Harmon  2021)    Dear Dr. Emmy Woody,     I saw Camila Concepcion and her mother Benoit Banegas in our Lactation office today. Mom came in today to follow up on intake at breast using a new nipple shield. Date Weight Comments   20 6-11 Birthweight   20 6-5.5 Discharge Weight   20 6-8 At [facility]   20 6-7.7 Naked At [address] OP Lactation    21 8-12 Naked At [address] OP Lactation   21 9-8.9 Naked At [address] OP Lactation   Gained in 13 oz in 19 days. Average 0.68 oz per day. Gained 36 oz in 37 days prior. Date Side Position Time Before Wt. After Wt Total   21 L* Upright cradle 1054 22 min  1119 6 min 4358  4392** 4418  4402 60 ml  10 ml  Total: 70 ml     R* Upright football to cradle 1127 25 min   4450 48 ml   Total:  138 ml ~ 4 oz   *Haaka \"bottle\" nipple shield. **Diaper Change    Estimated Needs:  Baby needs 24-26 oz of breast milk/formula per day based on 7 feedings per day. Baby needs 3.5+ oz of breast milk/formula per feeding. The more often baby eats the less volume they need per feeding. Emy Caller was alert and ready to nurse. She was content to go to breast.  Mom is using a haaka style bottle-like nipple shield. [address] easily. Takes a few minutes for let-down to fill shield to get to swallowing. In this time, Camila Concepcion is burning energy and may lead to her tiring out. Suggested mom hand express to start letdown and pre-fill shield with milk. Shield gets compressed on R side even with similar positioning. Did try football and cradle incase she prefers to lay on R side. At this feeding the R side ws not uncomfortable per mom. Mom has been doing 1 sided feedings for up to 40 minutes, pumping and offering up to 2 oz. Today she offered both sides and Consuelo took a full feeding at the breast.  Mom can continue to pump and bottle if desired.   Suggested mom offer both sides now and wean down from offering extra milk and extra pumping. This particular shape of nipple shield seems to work well for her, but is definitely driven by mom's let-down. Plan:  Continue with on-demand feedings. Discuss with OT exercises that will help strengthen suck and pull at breast.  Try to pre-fill nipple shield to help speed up intake. Can offer both sides for up to 25 minutes each side. Follow weight gain and contentment after nursing. Wean down from offering extra milk and extra pumping as taking both sides at breast well.       Follow-up: To Ped 3-2-21. Will call Tuesday 3-2-21. Call as needed before.     Sincerely,      Ramon Arizmendi Blanco [address], 15 [address], 320 [facility]

## 2021-02-23 NOTE — LACTATION NOTE
Outpatient Feeding Plan for Breastfeeding  [phone]  Patient: Zoila Wallis  Gestational Age: 41w 5d  Diagnosis:  V 24.1/Z39.1 Post tongue and lip tie revisions  [facility]   Start Time:  3651  Stop Time:  1200    Good, active breastfeeding is when baby is alert, tugging the nipple in long, deep pulls, and you can hear swallows every 1-2 sucks. By now Mom's milk should be in. Brief, light or shallow sucks or short rapid sucks without frequent swallows should not be considered a full breastfeeding. 1. Complete the following mouth exercises prior to feeding:  Gum Massage, Lip Stretch and Non-nutritive Sucking    2. Put the baby to the breast during the day, on demand for up to 25 minutes on first side. Finish first side before offering other side. Can pump and bottle as desired. Use: Upright Positioning, Breast Compression and Breast Massage, Nipple shield     4. Pump for 5-10 minutes after nursing. Try to pump within 15 minutes of breastfeeding. Pump after daytime nursings, but goal is to wean off additional pumpings. Be consistent. 5. If pumping and bottle feeding, give baby 105+ ml/3.5 oz of breast milk/formula and double pump with massage and compression for 15 minutes. Estimated Needs:  Baby needs 24-26 oz of breast milk/formula per day based on 7 feedings per day. Baby needs 3.5+ oz of breast milk/formula per feeding. The more often baby eats the less volume they need per feeding. Date Weight Comments   12-22-20 6-11 Birthweight   12-24-20 6-5.5 Discharge Weight   12-28-20 6-8 At [facility]   12-29-20 6-7.7 Naked At [address] OP Lactation    2-4-21 8-12 Naked At [address] OP Lactation   2-23-21 9-8.9 Naked At [address] OP Lactation   Gained in 13 oz in 19 days. Average 0.68 oz per day. Gained 36 oz in 37 days prior.     Average weight gain for the first 3 months is 1oz per day.  Minimum weight gain in the first 3 months is 0.5 oz per day. Typically regaining to birth weight by 2 weeks. Date Side Position Time Before Wt. After Wt Total   2-23-21 L* Upright cradle 1054 22 min  1119 6 min 4358  4392** 4418  4402 60 ml  10 ml  Total: 70 ml     R* Upright football to cradle 1127 25 min   4450 48 ml   Total:  138 ml ~ 4 oz   *Haaka \"bottle\" nipple shield. **Diaper Change    Baby last ate at 0800. Took a 3 oz bottle and took 1 oz at 0930. Last nursed R last night. Last pumped at 0730. Usually nurses on 1 side per feeding. Pump and bottle at night. Takes 3-4 oz by bottle if not nursing in 10-20 min. Takes 2 oz after nursing, up to 1 hour later. Going to breast 3-4 times during the day. Pumping after nursing for 15 minutes  If pumping in place of feeding gets 2-3 oz per side. Gets 1-1.5 on nursed side and 2-2.5 oz on un-nursed side. Alfredo Marrero was alert and ready to nurse. She was content to go to breast.  Mom is using a haaka style bottle-like nipple shield. [address] easily. Takes a few minutes for let-down to fill shield to get to swallowing. In this time, Alfredo Marrero is burning energy and may lead to her tiring out. Suggested mom hand express to start letdown and pre-fill shield with milk. Shield gets compressed on R side even with similar positioning. Did try football and cradle incase she prefers to lay on R side. At this feeding the R side ws not uncomfortable per mom. Mom has been doing 1 sided feedings for up to 40 minutes, pumping and offering up to 2 oz. Today she offered both sides and Consuelo took a full feeding at the breast.  Mom can continue to pump and bottle if desired. Suggested mom offer both sides now and wean down from offering extra milk and extra pumping. This particular shape of nipple shield seems to work well for her, but is definitely driven by mom's let-down. Baby's    Oral Digital Assessment:  Lip flexible without blanching.   Tongue over gum. Pulling, but lighter suck. Palate is still high/bubble. OT visit 2-18-21. Tongue and lip released 1-7-21 by [place]. Output:  Soaking wets. Yellow, runny, seedy, stools. Not always daily, Sometimes skips a day. Mom's    Nipples:  Large. Wide. Well everted. R sore with feeding \"pinched\" at times. Breasts:  Large    Plan:  Continue with on-demand feedings. Discuss with OT exercises that will help strengthen suck and pull at breast.  Try to pre-fill nipple shield to help speed up intake. Can offer both sides for up to 25 minutes each side. Follow weight gain and contentment after nursing. Wean down from offering extra milk and extra pumping as taking both sides at breast well. Follow-up: To Ped 3-2-21. Will call Tuesday 3-2-21. Call as needed before. Outpatient Feeding Plan for Breastfeeding  [phone]  Patient: Susan Ruvalcaba  Gestational Age: 41w 5d  Diagnosis:  V 24.1/Z39.1 Not latching  [facility]   Start Time:  56 (late check-in)  Stop Time:  1220     Good, active breastfeeding is when baby is alert, tugging the nipple in long, deep pulls, and you can hear swallows every 1-2 sucks. By now Mom's milk should be in. Brief, light or shallow sucks or short rapid sucks without frequent swallows should not be considered a full breastfeeding. 1. Complete the following mouth exercises prior to feeding:  Gum Massage, Lip Stretch and Non-nutritive Sucking     2. Put the baby to the breast 2 times per day for 15 minutes per side (if latching). Finish first side before offering other side. Try laid back or side lying position. Use: Nipple Shield, Breast Compression and Breast Massage     3. After breastfeeding, supplement your baby by bottle as needed of breast milk/formula: 30ml = 1oz. Position the baby upright to pace bottle feed.   Ensure the nipple is all the way in the baby's mouth and lips are flanged around the bottle. 4. Pump for 5-10 minutes after nursing. Try to pump within 15 minutes of breastfeeding. Be consistent. 5. If pumping and bottle feeding, give baby at least 90 ml/3 oz of breast milk/formula and double pump with massage and compression for 15 minutes. Estimated Needs:  Baby needs 22-24 oz of breast milk/formula per day based on 8 feedings per day. Baby needs 90 ml/3 oz of breast milk/formula per feeding. The more often baby eats the less volume they need per feeding. Date Weight Comments   12-22-20 6-11 Birthweight   12-24-20 6-5.5 Discharge Weight   12-28-20 6-8 At [facility]   12-29-20 6-7.7 Naked At [facility] OP Lactation    2-4-21 8-12 Naked At [facility] OP Lactation   2-23-21 9-8.9 Naked At [facility] OP Lactation   Gained in 13 oz in 19 days. Average 0.68 oz per day. Gained 36 oz in 37 days. Average weight gain for the first 3 months is 1oz per day. Minimum weight gain in the first 3 months is 0.5 oz per day. Typically regaining to birth weight by 2 weeks. Consuelo was alert and showing feeding cues. Mom has had limited success at the breast.  Occasionally Consuelo will nurse for up to 10 minutes about once per day with a nipple shield. Pumping and bottle feeding for most feedings. Good supply volume. Pumps 3-4 oz per side. Mom feels baby takes a long time to bottle feed as well. After multiple attempts at breast with and without the shield (24 and 20 mm), with milk flow, change in nipple shield size and stating with a bottle and switching, we did not achieve a latch. Switched to bottle feeding. Leaks with bottle. Laryngomalacia sounds with drinking. Tires before bottle is empty. Feeding follow up post tongue and lip tie release was delayed due to covid quarantine by 3 weeks. Fer Floyd seems to have some breast aversion. Discussed happy time at breast unrelated to feeding, which mom is doing.   Although weight gain has been appropriate, feeding is a stressful time at breast and with bottle. Suggested mom discuss possible consult with OT or SLP for info on improving efficiency. Per mom, older sister sees an OT currently for sensory issues. Baby's               Oral Digital Assessment:  Tongue out and over gum. Bubble palate. T              Output:  Soaking wets. Yellow, runny, seedy, frequent stools. Mom's               Nipples: Wider, everted. Breasts:  Large. Plan:  Continue to work on feedings at breast.  As stamina increases so should efficiency and ability at breast.  May want to ask about possible OT or SLP evaluation to help with strength and stamina for breast and bottle. Scott Smith has had good weight gain, but efficiency with feeding could benefit from evaluation. Follow-up: To Ped 3-2-21. Will call 2-9-21. Call as needed before.

## 2021-03-19 ENCOUNTER — HOSPITAL ENCOUNTER (OUTPATIENT)
Dept: LACTATION | Age: 36
Discharge: HOME | End: 2021-03-19
Payer: COMMERCIAL

## 2021-03-19 PROCEDURE — 99213 OFFICE O/P EST LOW 20 MIN: CPT

## 2021-03-19 NOTE — LACTATION NOTE
3/19/2021  Re: Boo Martínez  20)    Dear Dr. Ovalle,     I saw [facility] and her mother Sushma Branham in our Lactation office today. Mom came in for a weight re-check and feed and weigh. Date Weight Comments   20 6-11 Birthweight   20 6-5.5 Discharge Weight   20 6-8 At [facility]   20 6-7.7 Naked At [facility] OP Lactation    21 8-12 Naked At [facility] OP Lactation   21 9-8.9 Naked At [facility] OP Lactation   3-2-21 10 At [facility] in diaper. 3-19-21 10-6.4 Naked At [facility] OP Lactation   Gained 12.5 oz in 24 days. Average 0.5 oz per day. Gained in 13 oz in 19 days. Average 0.68 oz per day. Estimated Needs:  Baby needs 26-28 oz of breast milk/formula per day based on 7 feedings per day. Baby needs 120 ml/4 oz of breast milk/formula per feeding. The more often baby eats the less volume they need per feeding. Corrinne Chihuahua was alert and ready to nurse. Mom is mostly breastfeeding with one pump and bottle at night. Consuelo's weight gain is right at the minimum expectation. Mom's milk flow continues to be good. Suggested a few changes to help maintain weight gain and mom is returning to work in a little over 1 week. She took just under 4 oz at this feed and weigh. There was plenty of milk available in the shield if she had wanted more. Plan:  Continue with on-demand feedings. Suggest at the 11pm feeding to either do a 4 oz bottle العلي offer 3 oz after a sleepy feeding. Once returning to work, may want to make [phone] feeding a nursing session. Suggest sending at least 4 oz in bottles during work hours. Make the most of work commute for hands free pumping. May want to build storage supply by pumping after a few daytime feedings.       Follow-up: To Ped for 4 month. Call after 4 month. Call as needed before.     Sincerely,      Ramon Dennison Blanco [address], [address] [place] [address], 320 [facility]

## 2021-03-19 NOTE — LACTATION NOTE
Outpatient Feeding Plan for Breastfeeding  [phone]  Patient: Nava Maher  Gestational Age: 39w 5d  Diagnosis:  V 24.1/Z39.1 Post tongue and lip tie revisions  Jennifer Valentino  Pediatrician Dr. Gerald Hampton  OB/GYN Dr. Glass [facility], [facility]  Start Time:  1969  Stop Time:  0171 + [place] Fitting. Good, active breastfeeding is when baby is alert, tugging the nipple in long, deep pulls, and you can hear swallows every 1-2 sucks. By now Mom's milk should be in. Brief, light or shallow sucks or short rapid sucks without frequent swallows should not be considered a full breastfeeding. 1. Complete the following mouth exercises prior to feeding:  Gum Massage, Lip Stretch and Non-nutritive Sucking     2. Put the baby to the breast during the day, on demand for up to 25 minutes on first side. Finish first side before offering other side. Can pump and bottle as desired.      Use: Upright Positioning, Breast Compression and Breast Massage, Nipple shield      4. Pump for 5-10 minutes after nursing. Try to pump within 15 minutes of breastfeeding. Choose 2-3 times per day to pump. Be consistent. 5. If pumping and bottle feeding, give baby 120ml/4 oz of breast milk/formula and double pump with massage and compression for 15 minutes. Estimated Needs:  Baby needs 26-28 oz of breast milk/formula per day based on 7 feedings per day. Baby needs 120 ml/4 oz of breast milk/formula per feeding. The more often baby eats the less volume they need per feeding. Date Weight Comments   12-22-20 6-11 Birthweight   12-24-20 6-5.5 Discharge Weight   12-28-20 6-8 At [facility]   12-29-20 6-7.7 Naked At [facility] OP Lactation    2-4-21 8-12 Naked At [facility] OP Lactation   2-23-21 9-8.9 Naked At [facility] OP Lactation   3-2-21 10 At [facility] in diaper. 3-19-21 10-6.4 Naked At [facility] OP Lactation   Gained 12.5 oz in 24 days. Average 0.5 oz per day.     Gained in 13 oz in 19 days. Average 0.68 oz per day. Gained 36 oz in 37 days prior.    Average weight gain for 3-6 months is 0.75 oz per day.  Minimum weight gain in the first 3 months is 0.5 oz per day. Date Side Position Time Before Wt. After Wt Total   3-19-21 L* cradle 0843 22 min  4740 4776 36 ml     R*  0910 27 min  4852 76 ml  Total 112 ml ~3.75 oz   *Haaka nipple shield  70 ml L and 48 ml R at last feed and weigh. Baby last ate at [facility]. Bottle fed. And pumped 4-5 oz. Last  at 11pm and pumped. Got 3 oz and did not give extra. Gets 1 4 oz bottle each night and mom pumps 4-5 oz. Pumps before bed and sometimes give extra after that last nursing. Returning to work 3-29-21. Connie Kinsey was alert and ready to nurse. Mom is mostly breastfeeding with one pump and bottle at night. Consuelo's weight gain is right at the minimum expectation. Mom's milk flow continues to be good. Suggested a few changes to help maintain weight gain and mom is returning to work in a little over 1 week. She took just under 4 oz at this feed and weigh. There was plenty of milk available in the shield if she had wanted more. Baby's    Oral Digital Assessment:  Lip flexible without blanching. Tongue over gum. Pulling, but lighter suck. Palate is still high/bubble. Has not been back to OT since 2-18-21 visit. Tongue and lip released 1-7-21 by [place]. Output:  Soaking wets. Yellow, runny, seedy, stools. Stooling about every other day. Large when she skips a day. Mom's    Nipples:  Large, wide, well everted. Shield full of milk on each release. Breasts:  Large    Plan:  Continue with on-demand feedings. Suggest at the 11pm feeding to either do a 4 oz bottle العلي offer 3 oz after a sleepy feeding. Once returning to work, may want to make [facility] feeding a nursing session. Suggest sending at least 4 oz in bottles during work hours. Make the most of work commute for hands free pumping.   May want to build storage supply by pumping after a few daytime feedings. For return of menstrual cycle suggest daily multi vitamin and additional 1000 mg of 500 mg of magnesium. Follow-up: To Ped for 4 month. Call after 4 month. Call as needed before.

## 2021-06-10 ENCOUNTER — HOSPITAL ENCOUNTER (OUTPATIENT)
Dept: LAB | Age: 36
Discharge: HOME | End: 2021-06-10
Payer: COMMERCIAL

## 2021-06-10 DIAGNOSIS — O99.013 ANEMIA AFFECTING PREGNANCY IN THIRD TRIMESTER: ICD-10-CM

## 2021-06-10 DIAGNOSIS — Z13.0 SCREENING, ANEMIA, DEFICIENCY, IRON: ICD-10-CM

## 2021-06-10 LAB
ABO + RH BLD: NORMAL
ALBUMIN SERPL-MCNC: 3.9 G/DL (ref 3.5–5)
ALBUMIN/GLOB SERPL: 1 {RATIO} (ref 1.2–3.5)
ALP SERPL-CCNC: 107 U/L (ref 50–136)
ALT SERPL-CCNC: 21 U/L (ref 12–65)
ANION GAP SERPL CALC-SCNC: 8 MMOL/L (ref 7–16)
APTT PPP: 31.3 SEC (ref 24.1–35.1)
AST SERPL-CCNC: 14 U/L (ref 15–37)
BASOPHILS # BLD: 0.1 K/UL (ref 0–0.2)
BASOPHILS NFR BLD: 1 % (ref 0–2)
BILIRUB SERPL-MCNC: 0.3 MG/DL (ref 0.2–1.1)
BLOOD GROUP ANTIBODIES SERPL: NORMAL
BUN SERPL-MCNC: 14 MG/DL (ref 6–23)
CALCIUM SERPL-MCNC: 9.3 MG/DL (ref 8.3–10.4)
CHLORIDE SERPL-SCNC: 105 MMOL/L (ref 98–107)
CO2 SERPL-SCNC: 26 MMOL/L (ref 21–32)
CREAT SERPL-MCNC: 0.7 MG/DL (ref 0.6–1)
DIFFERENTIAL METHOD BLD: NORMAL
EOSINOPHIL # BLD: 0.4 K/UL (ref 0–0.8)
EOSINOPHIL NFR BLD: 5 % (ref 0.5–7.8)
ERYTHROCYTE [DISTWIDTH] IN BLOOD BY AUTOMATED COUNT: 12.9 % (ref 11.9–14.6)
FERRITIN SERPL-MCNC: 30 NG/ML (ref 8–388)
FIBRINOGEN PPP-MCNC: 433 MG/DL (ref 190–501)
GLOBULIN SER CALC-MCNC: 3.9 G/DL (ref 2.3–3.5)
GLUCOSE SERPL-MCNC: 97 MG/DL (ref 65–100)
HCT VFR BLD AUTO: 36.5 % (ref 35.8–46.3)
HGB BLD-MCNC: 11.9 G/DL (ref 11.7–15.4)
IMM GRANULOCYTES # BLD AUTO: 0.1 K/UL (ref 0–0.5)
IMM GRANULOCYTES NFR BLD AUTO: 1 % (ref 0–5)
INR PPP: 0.9
IRON SATN MFR SERPL: 13 %
IRON SERPL-MCNC: 42 UG/DL (ref 35–150)
LYMPHOCYTES # BLD: 2.3 K/UL (ref 0.5–4.6)
LYMPHOCYTES NFR BLD: 29 % (ref 13–44)
MCH RBC QN AUTO: 29 PG (ref 26.1–32.9)
MCHC RBC AUTO-ENTMCNC: 32.6 G/DL (ref 31.4–35)
MCV RBC AUTO: 89 FL (ref 79.6–97.8)
MONOCYTES # BLD: 0.5 K/UL (ref 0.1–1.3)
MONOCYTES NFR BLD: 6 % (ref 4–12)
NEUTS SEG # BLD: 4.8 K/UL (ref 1.7–8.2)
NEUTS SEG NFR BLD: 59 % (ref 43–78)
NRBC # BLD: 0 K/UL (ref 0–0.2)
PLATELET # BLD AUTO: 271 K/UL (ref 150–450)
PMV BLD AUTO: 10.4 FL (ref 9.4–12.3)
POTASSIUM SERPL-SCNC: 4 MMOL/L (ref 3.5–5.1)
PROT SERPL-MCNC: 7.8 G/DL (ref 6.3–8.2)
PROTHROMBIN TIME: 12.8 SEC (ref 12.5–14.7)
RBC # BLD AUTO: 4.1 M/UL (ref 4.05–5.2)
SODIUM SERPL-SCNC: 139 MMOL/L (ref 136–145)
SPECIMEN EXP DATE BLD: NORMAL
THROMBIN TIME: 16.2 SECS (ref 15.4–17.9)
TIBC SERPL-MCNC: 321 UG/DL (ref 250–450)
WBC # BLD AUTO: 8.1 K/UL (ref 4.3–11.1)

## 2021-06-10 PROCEDURE — 85384 FIBRINOGEN ACTIVITY: CPT

## 2021-06-10 PROCEDURE — 36415 COLL VENOUS BLD VENIPUNCTURE: CPT

## 2021-06-10 PROCEDURE — 83540 ASSAY OF IRON: CPT

## 2021-06-10 PROCEDURE — 80053 COMPREHEN METABOLIC PANEL: CPT

## 2021-06-10 PROCEDURE — 85730 THROMBOPLASTIN TIME PARTIAL: CPT

## 2021-06-10 PROCEDURE — 85670 THROMBIN TIME PLASMA: CPT

## 2021-06-10 PROCEDURE — 82728 ASSAY OF FERRITIN: CPT

## 2021-06-10 PROCEDURE — 85610 PROTHROMBIN TIME: CPT

## 2021-06-10 PROCEDURE — 86901 BLOOD TYPING SEROLOGIC RH(D): CPT

## 2021-06-10 PROCEDURE — 85240 CLOT FACTOR VIII AHG 1 STAGE: CPT

## 2021-06-10 PROCEDURE — 85025 COMPLETE CBC W/AUTO DIFF WBC: CPT

## 2021-06-11 LAB
FACT VIII ACT/NOR PPP: 164 % (ref 56–140)
INTERPRETATION, 910378, CSIR1: ABNORMAL
VWF AG ACT/NOR PPP IA: 168 % (ref 50–200)
VWF:RCO ACT/NOR PPP PL AGG: 161 % (ref 50–200)

## 2021-10-06 PROBLEM — Z13.220 LIPID SCREENING: Status: ACTIVE | Noted: 2021-10-06

## 2022-03-18 PROBLEM — O09.523 MULTIGRAVIDA OF ADVANCED MATERNAL AGE IN THIRD TRIMESTER: Status: ACTIVE | Noted: 2020-10-08

## 2022-03-19 PROBLEM — O24.414 INSULIN CONTROLLED GESTATIONAL DIABETES MELLITUS (GDM) IN THIRD TRIMESTER: Status: ACTIVE | Noted: 2020-10-08

## 2022-03-19 PROBLEM — O99.013 ANEMIA AFFECTING PREGNANCY IN THIRD TRIMESTER: Status: ACTIVE | Noted: 2020-10-14

## 2022-03-19 PROBLEM — Z13.220 LIPID SCREENING: Status: ACTIVE | Noted: 2021-10-06

## 2022-03-19 PROBLEM — O99.213 OBESITY AFFECTING PREGNANCY IN THIRD TRIMESTER: Status: ACTIVE | Noted: 2020-10-14

## 2022-04-13 PROBLEM — E66.9 OBESITY: Status: ACTIVE | Noted: 2022-04-13

## 2022-04-13 PROBLEM — R73.03 PREDIABETES: Status: ACTIVE | Noted: 2022-04-13

## 2022-04-13 PROBLEM — E78.1 HYPERTRIGLYCERIDEMIA: Status: ACTIVE | Noted: 2021-10-06

## 2022-04-18 DIAGNOSIS — E78.1 HYPERTRIGLYCERIDEMIA: ICD-10-CM

## 2022-04-18 DIAGNOSIS — Z86.2 HISTORY OF IRON DEFICIENCY ANEMIA: Primary | ICD-10-CM

## 2022-04-18 DIAGNOSIS — R73.03 PREDIABETES: ICD-10-CM

## 2022-08-03 ENCOUNTER — OFFICE VISIT (OUTPATIENT)
Dept: OBGYN CLINIC | Age: 37
End: 2022-08-03
Payer: COMMERCIAL

## 2022-08-03 VITALS
SYSTOLIC BLOOD PRESSURE: 116 MMHG | DIASTOLIC BLOOD PRESSURE: 72 MMHG | BODY MASS INDEX: 33.59 KG/M2 | HEIGHT: 67 IN | WEIGHT: 214 LBS

## 2022-08-03 DIAGNOSIS — Z01.419 WELL WOMAN EXAM: Primary | ICD-10-CM

## 2022-08-03 DIAGNOSIS — Z11.51 SCREENING FOR HPV (HUMAN PAPILLOMAVIRUS): ICD-10-CM

## 2022-08-03 DIAGNOSIS — Z12.4 SCREENING FOR CERVICAL CANCER: ICD-10-CM

## 2022-08-03 PROCEDURE — 99395 PREV VISIT EST AGE 18-39: CPT | Performed by: OBSTETRICS & GYNECOLOGY

## 2022-08-03 NOTE — PROGRESS NOTES
Chief Complaint   Patient presents with    Annual Exam     Pt has no complaints for her annual exam today. Reports regular menses. LMP: Patient's last menstrual period was 2022 (exact date). Contraception: condoms  Social History     Substance and Sexual Activity   Sexual Activity Yes    Partners: Male    Birth control/protection: None    Comment: condoms       OB History:  OB History    Para Term  AB Living   4 3 3 0 1 3   SAB IAB Ectopic Molar Multiple Live Births   0 0 1 0 0 3      # Outcome Date GA Lbr Dago/2nd Weight Sex Delivery Anes PTL Lv   4 Term 20 39w5d 01:09 / 00:15 6 lb 11.2 oz (3.04 kg) F Vag-Spont EPI N KRIS      Name: Tommy Sherly: 9  Apgar5: 9   3 Term 16 38w0d 10:15 / 00:17 7 lb 11.8 oz (3.51 kg) F Vag-Spont  N KRIS      Name: EMMY,BABY FEMALE      Apgar1: 8  Apgar5: 9   2 Ectopic 2015 5w0d          1 Term 12   7 lb 2 oz (3.232 kg) F Vag-Spont   KRIS       Allergies: Allergies   Allergen Reactions    Penicillins Hives       Medication History:  Current Outpatient Medications   Medication Sig Dispense Refill    Ferrous Sulfate (IRON PO) Take by mouth      cetirizine (ZYRTEC) 10 MG tablet Take 10 mg by mouth       No current facility-administered medications for this visit. Past Medical History:  Past Medical History:   Diagnosis Date    Asthma     Ectopic pregnancy     Family history of ovarian cancer     History of gestational diabetes     History of iron deficiency anemia     Due to menses. Resolved with oral iron.       History of seizures as a child        Past Surgical History:  Past Surgical History:   Procedure Laterality Date    CHOLECYSTECTOMY, LAPAROSCOPIC  2021    GYN  2015    ectopic pregnancy surgery (right ovary and tube remain)    SALPINGO-OOPHORECTOMY Left 2002    For symptomatic 10 cm cyst (pathology benign)       Social History:  Social History     Socioeconomic History    Marital status:      Spouse name: Not on file    Number of children: Not on file    Years of education: Not on file    Highest education level: Not on file   Occupational History    Not on file   Tobacco Use    Smoking status: Never    Smokeless tobacco: Never   Vaping Use    Vaping Use: Never used   Substance and Sexual Activity    Alcohol use: Yes     Comment: occ    Drug use: Never    Sexual activity: Yes     Partners: Male     Birth control/protection: None     Comment: condoms   Other Topics Concern    Not on file   Social History Narrative    Not on file     Social Determinants of Health     Financial Resource Strain: Not on file   Food Insecurity: Not on file   Transportation Needs: Not on file   Physical Activity: Not on file   Stress: Not on file   Social Connections: Not on file   Intimate Partner Violence: Not on file   Housing Stability: Not on file       Family History:  Family History   Problem Relation Age of Onset    Hypertension Mother     Diabetes Maternal Grandfather     Thyroid Disease Sister         hypothyroidism    Gall Bladder Disease Sister     Ovarian Cancer Sister     Ovarian Cancer Mother     Asthma Mother     Adrian Legato Bladder Disease Mother     Diabetes Niece     Other Mother         fibromyalgia    Thyroid Disease Mother         hypothyroidism       /72   Ht 5' 7\" (1.702 m)   Wt 214 lb (97.1 kg)   LMP 07/20/2022 (Exact Date)   BMI 33.52 kg/m²      12 point ROS is negative. Physical Exam  Vitals signs reviewed. Constitutional:       General: She is not in acute distress. Appearance: Normal appearance. She is well-developed. She is not ill-appearing, toxic-appearing or diaphoretic. HENT:      Head: Normocephalic and atraumatic. Eyes:      General: No scleral icterus. Conjunctiva/sclera: Conjunctivae normal.      Pupils: Pupils are equal, round, and reactive to light. Neck:      Musculoskeletal: Normal range of motion and neck supple. Thyroid: No thyromegaly. Lymphadenopathy:      Cervical: No cervical adenopathy. Upper Body:      Right upper body: No supraclavicular adenopathy. Left upper body: No supraclavicular adenopathy. Lower Body: No right inguinal adenopathy. No left inguinal adenopathy. Skin:     General: Skin is warm and dry. Coloration: Skin is not pale. Findings: No erythema or rash. Neurological:      Mental Status: She is alert and oriented to person, place, and time. Cranial Nerves: No cranial nerve deficit. Motor: No abnormal muscle tone. Coordination: Coordination normal.   Psychiatric:         Behavior: Behavior normal.         Thought Content: Thought content normal.         Judgment: Judgment normal.         1. Well woman exam    2. Screening for HPV (human papillomavirus)    3. Screening for cervical cancer      Orders Placed This Encounter   Procedures    PAP IG, HPV Rfx HPV 16/18,45     Routine age-appropriate well woman counseling was performed.   Yearly paps for now, discussed w/ pt and pt prefers, tierra given her sister's hx.     FU in 1 year for annual exam.

## 2022-08-12 LAB
CYTOLOGIST CVX/VAG CYTO: ABNORMAL
CYTOLOGY CVX/VAG DOC THIN PREP: ABNORMAL
HPV APTIMA: NEGATIVE
Lab: ABNORMAL
PATH REPORT.FINAL DX SPEC: ABNORMAL
PATHOLOGIST CVX/VAG CYTO: ABNORMAL
PATHOLOGIST PROVIDED ICD: ABNORMAL
STAT OF ADQ CVX/VAG CYTO-IMP: ABNORMAL

## 2022-09-14 ENCOUNTER — PROCEDURE VISIT (OUTPATIENT)
Dept: OBGYN CLINIC | Age: 37
End: 2022-09-14
Payer: COMMERCIAL

## 2022-09-14 VITALS
HEIGHT: 67 IN | BODY MASS INDEX: 33.59 KG/M2 | DIASTOLIC BLOOD PRESSURE: 70 MMHG | WEIGHT: 214 LBS | SYSTOLIC BLOOD PRESSURE: 118 MMHG

## 2022-09-14 DIAGNOSIS — Z80.41 FAMILY HISTORY OF OVARIAN CANCER: ICD-10-CM

## 2022-09-14 DIAGNOSIS — R87.610 ASCUS OF CERVIX WITH NEGATIVE HIGH RISK HPV: Primary | ICD-10-CM

## 2022-09-14 LAB
HCG, PREGNANCY, URINE, POC: NEGATIVE
VALID INTERNAL CONTROL, POC: YES

## 2022-09-14 PROCEDURE — 81025 URINE PREGNANCY TEST: CPT | Performed by: OBSTETRICS & GYNECOLOGY

## 2022-09-14 PROCEDURE — 57454 BX/CURETT OF CERVIX W/SCOPE: CPT | Performed by: OBSTETRICS & GYNECOLOGY

## 2022-09-14 NOTE — PROGRESS NOTES
Chief Complaint   Patient presents with    Colposcopy     Pap 8/3/22 ASCUS w/ family history of ovarian cancer in sister         Procedure:     Colposcopy observations:   Scattered small areas of non-staining with lugols seen in all 4 quadrants of the cervix. Small ACW change noted at 7:00 as well. No other abnormalities were observed. Adequate colposcopy. Colposcopy procedure: UPT negative in the office today. Consent signed and time out performed. Patient positioned in lithotomy position. A bivalve speculum was placed into the vagina. The cervix was bathed in acetic acid, inspected, then bathed in Lugol's solution. Colposcopy observations were seen as detailed above. An endocervical curettage was performed. Cervical biopsies were performed at 10, 2, 5 & 7 o' clock positions. Monsel's solution was applied for good hemostasis. All instruments were removed from the vagina. Patient tolerated procedure well. 1. ASCUS of cervix with negative high risk HPV    2. Family history of ovarian cancer      Orders Placed This Encounter   Procedures    COLPOSC,CERVIX W/ADJ VAG,W/BX & CURRETAG    [facility] Surgical Pathology    AMB POC URINE PREGNANCY TEST, VISUAL COLOR COMPARISON     Reviewed indications and option to proceed with colposcopy - pt desired to proceed. Consent obtained and pt tolerated procedure well. Will plan follow up based on this.

## 2022-10-11 ENCOUNTER — PROCEDURE VISIT (OUTPATIENT)
Dept: OBGYN CLINIC | Age: 37
End: 2022-10-11
Payer: COMMERCIAL

## 2022-10-11 VITALS — HEIGHT: 67 IN | BODY MASS INDEX: 33.52 KG/M2

## 2022-10-11 DIAGNOSIS — R87.610 ASCUS OF CERVIX WITH NEGATIVE HIGH RISK HPV: Primary | ICD-10-CM

## 2022-10-11 DIAGNOSIS — G89.18 PAIN FOLLOWING SURGERY OR PROCEDURE: ICD-10-CM

## 2022-10-11 DIAGNOSIS — R87.7 LOW GRADE SQUAMOUS INTRAEPITHELIAL LESION (LGSIL) ON BIOPSY OF CERVIX: ICD-10-CM

## 2022-10-11 PROCEDURE — 57460 BX OF CERVIX W/SCOPE LEEP: CPT | Performed by: OBSTETRICS & GYNECOLOGY

## 2022-10-11 RX ORDER — OXYCODONE HYDROCHLORIDE 5 MG/1
5 TABLET ORAL EVERY 6 HOURS PRN
Qty: 12 TABLET | Refills: 0 | Status: SHIPPED | OUTPATIENT
Start: 2022-10-11 | End: 2022-10-14

## 2022-10-11 NOTE — PROGRESS NOTES
LEEP OPERATIVE NOTE      Date:  October 19, 2022    Name:  Nancey Primrose  YOB: 1985 Age:  40 y.o. LMP: No LMP recorded. BC Method:  condoms    UCG:  negative    Informed consent was obtained. All risks, benefits, and alternatives discussed at length. Biopsy and previous office notes reviewed. Patient was placed in lithotomy position. Speculum inserted and Bovie pad applied. Acetic acid was used for colposcopy awith lugols. Lesion identified, focused on 5:00 where her dysplasia was seen on pathology. Cervix was infiltrated with 1% xylocaine with epinephrine @ 80:20. Approximately 8cc was used. Under colposcopic visualization, a loop electrode used to excise an anterior and posterior specimen in two passes. Settings were 80 coag 80 cut blended. Base was coagulated with a ball electrode. Monsels was applied with good hemostasis. Patient was given pelvic rest, bleeding and infection precautions. Judith Martin was seen today for procedure. Diagnoses and all orders for this visit:    ASCUS of cervix with negative high risk HPV  -     MI COLPOSCOPY,CERVIX W/ADJ VAG,W/LOOP BX  -     [facility]    Low grade squamous intraepithelial lesion (LGSIL) on biopsy of cervix  -     MI COLPOSCOPY,CERVIX W/ADJ VAG,W/LOOP BX  -     [facility] Pathology    Pain following surgery or procedure  -     oxyCODONE (ROXICODONE) 5 MG immediate release tablet; Take 1 tablet by mouth every 6 hours as needed for Pain for up to 3 days. Intended supply: 3 days. Take lowest dose possible to manage pain       Return if symptoms worsen or fail to improve, for pathology to determine followup.     Lori Cote MD

## 2022-10-31 ENCOUNTER — OFFICE VISIT (OUTPATIENT)
Dept: OBGYN CLINIC | Age: 37
End: 2022-10-31
Payer: COMMERCIAL

## 2022-10-31 VITALS
SYSTOLIC BLOOD PRESSURE: 122 MMHG | BODY MASS INDEX: 33.27 KG/M2 | DIASTOLIC BLOOD PRESSURE: 80 MMHG | HEIGHT: 67 IN | WEIGHT: 212 LBS

## 2022-10-31 DIAGNOSIS — Z98.890 S/P LEEP: Primary | ICD-10-CM

## 2022-10-31 DIAGNOSIS — R87.7 LOW GRADE SQUAMOUS INTRAEPITHELIAL LESION (LGSIL) ON BIOPSY OF CERVIX: ICD-10-CM

## 2022-10-31 PROCEDURE — 99213 OFFICE O/P EST LOW 20 MIN: CPT | Performed by: OBSTETRICS & GYNECOLOGY

## 2022-10-31 PROCEDURE — G8484 FLU IMMUNIZE NO ADMIN: HCPCS | Performed by: OBSTETRICS & GYNECOLOGY

## 2022-10-31 PROCEDURE — G8427 DOCREV CUR MEDS BY ELIG CLIN: HCPCS | Performed by: OBSTETRICS & GYNECOLOGY

## 2022-10-31 PROCEDURE — G8417 CALC BMI ABV UP PARAM F/U: HCPCS | Performed by: OBSTETRICS & GYNECOLOGY

## 2022-10-31 PROCEDURE — 1036F TOBACCO NON-USER: CPT | Performed by: OBSTETRICS & GYNECOLOGY

## 2022-10-31 NOTE — PROGRESS NOTES
Patient presents today for   Chief Complaint   Patient presents with    Follow-up     Recheck after LEEP/results         LMP: Patient's last menstrual period was 10/21/2022. Contraception: none        Allergies   Allergen Reactions    Penicillins Hives     Current Outpatient Medications   Medication Sig Dispense Refill    Ferrous Sulfate (IRON PO) Take by mouth      cetirizine (ZYRTEC) 10 MG tablet Take 10 mg by mouth       No current facility-administered medications for this visit. Past Medical History:   Diagnosis Date    Abnormal Pap smear of cervix     Asthma     Ectopic pregnancy 2015    Family history of ovarian cancer     History of gestational diabetes     History of iron deficiency anemia     Due to menses. Resolved with oral iron.       History of seizures as a child      Past Surgical History:   Procedure Laterality Date    CHOLECYSTECTOMY, LAPAROSCOPIC  01/27/2021    GYN  04/2015    ectopic pregnancy surgery (right ovary and tube remain)    LEEP      SALPINGO-OOPHORECTOMY Left 12/2002    For symptomatic 10 cm cyst (pathology benign)     Social History     Socioeconomic History    Marital status:      Spouse name: Not on file    Number of children: Not on file    Years of education: Not on file    Highest education level: Not on file   Occupational History    Not on file   Tobacco Use    Smoking status: Never    Smokeless tobacco: Never   Vaping Use    Vaping Use: Never used   Substance and Sexual Activity    Alcohol use: Yes     Comment: occ    Drug use: Never    Sexual activity: Yes     Partners: Male     Birth control/protection: None     Comment: condoms   Other Topics Concern    Not on file   Social History Narrative    Not on file     Social Determinants of Health     Financial Resource Strain: Not on file   Food Insecurity: Not on file   Transportation Needs: Not on file   Physical Activity: Not on file   Stress: Not on file   Social Connections: Not on file   Intimate Partner Violence: Not on file   Housing Stability: Not on file     Family History   Problem Relation Age of Onset    Hypertension Mother     Diabetes Maternal Grandfather     Thyroid Disease Sister         hypothyroidism    Gall Bladder Disease Sister     Ovarian Cancer Sister     Ovarian Cancer Mother     Asthma Mother     Rodkapil Dines Bladder Disease Mother     Diabetes Niece     Other Mother         fibromyalgia    Thyroid Disease Mother         hypothyroidism     /80   Ht 5' 7\" (1.702 m)   Wt 212 lb (96.2 kg)   LMP 10/21/2022   BMI 33.20 kg/m²      Review of Systems   Constitutional: Negative. HENT: Negative. Eyes: Negative. Respiratory: Negative. Cardiovascular: Negative. Gastrointestinal: Negative. Endocrine: Negative. Genitourinary: Negative. Musculoskeletal: Negative. Allergic/Immunologic: Negative. Neurological: Negative. Hematological: Negative. Psychiatric/Behavioral: Negative. Physical Exam  Vitals signs reviewed. Constitutional:       General: She is not in acute distress. Appearance: Normal appearance. She is well-developed. She is not ill-appearing, toxic-appearing or diaphoretic. HENT:      Head: Normocephalic and atraumatic. Nose: Nose normal.   Eyes:      General: No scleral icterus. Conjunctiva/sclera: Conjunctivae normal.      Pupils: Pupils are equal, round, and reactive to light. Neck:      Musculoskeletal: Normal range of motion. No acute abnormality  Pulmonary:      Effort: Pulmonary effort is normal. No respiratory distress. Abdominal:      No distension. Palpations: Abdomen is soft. There is no mass. Tenderness: There is no abdominal tenderness. There is no guarding or rebound. Genitourinary:     Labia:         Right: No rash, tenderness, lesion or injury. Left: No rash, tenderness, lesion or injury. Vagina: Normal. No signs of injury and foreign body. No vaginal discharge, erythema, tenderness or bleeding. Cervix: No cervical motion tenderness, discharge or friability. Betadine placed on uterine sound which was easily passed through the cervix in order to be certain it is not stenotic. Pt tolerated without discomfort. Uterus: Not enlarged and not tender. Adnexa:         Right: No mass, tenderness or fullness. Left: No mass, tenderness or fullness. Comments: External genitalia are normal in appearance. Visual examination of anus and perineum shows no abnormalities. Musculoskeletal: Normal range of motion. General: No tenderness. Skin:     General: Skin is warm and dry. Coloration: Skin is not pale. Findings: No erythema or rash. Neurological:      Mental Status: She is alert and oriented to person, place, and time. Cranial Nerves: No cranial nerve deficit. Motor: No abnormal muscle tone. Coordination: Coordination normal.   Psychiatric:         Behavior: Behavior normal.         Thought Content: Thought content normal.         Judgment: Judgment normal.      1. S/P LEEP    2. Low grade squamous intraepithelial lesion (LGSIL) on biopsy of cervix      Reviewed pt's pathology results from recent LEEP. No residual dysplasia was present. Will plan to repeat pap smear in 12 months unless issues arise prior to then. All questions answered. Cervix is not stenotic. Return if symptoms worsen or fail to improve, for Annual exam when due.

## 2022-11-08 ASSESSMENT — ENCOUNTER SYMPTOMS
GASTROINTESTINAL NEGATIVE: 1
RESPIRATORY NEGATIVE: 1
EYES NEGATIVE: 1
ALLERGIC/IMMUNOLOGIC NEGATIVE: 1

## 2022-12-30 ENCOUNTER — APPOINTMENT (OUTPATIENT)
Dept: GENERAL RADIOLOGY | Age: 37
End: 2022-12-30
Payer: COMMERCIAL

## 2022-12-30 ENCOUNTER — HOSPITAL ENCOUNTER (EMERGENCY)
Age: 37
Discharge: HOME | End: 2022-12-30
Attending: EMERGENCY MEDICINE
Payer: COMMERCIAL

## 2022-12-30 VITALS
DIASTOLIC BLOOD PRESSURE: 60 MMHG | BODY MASS INDEX: 32.96 KG/M2 | RESPIRATION RATE: 16 BRPM | OXYGEN SATURATION: 98 % | TEMPERATURE: 98.6 F | SYSTOLIC BLOOD PRESSURE: 130 MMHG | WEIGHT: 210 LBS | HEIGHT: 67 IN | HEART RATE: 80 BPM

## 2022-12-30 DIAGNOSIS — S93.402A SPRAIN OF LEFT ANKLE, UNSPECIFIED LIGAMENT, INITIAL ENCOUNTER: Primary | ICD-10-CM

## 2022-12-30 PROCEDURE — 99283 EMERGENCY DEPT VISIT LOW MDM: CPT

## 2022-12-30 PROCEDURE — 73610 X-RAY EXAM OF ANKLE: CPT

## 2022-12-30 ASSESSMENT — PAIN - FUNCTIONAL ASSESSMENT: PAIN_FUNCTIONAL_ASSESSMENT: 0-10

## 2022-12-30 ASSESSMENT — LIFESTYLE VARIABLES
HOW OFTEN DO YOU HAVE A DRINK CONTAINING ALCOHOL: MONTHLY OR LESS
HOW MANY STANDARD DRINKS CONTAINING ALCOHOL DO YOU HAVE ON A TYPICAL DAY: 1 OR 2

## 2022-12-30 ASSESSMENT — PAIN SCALES - GENERAL
PAINLEVEL_OUTOF10: 6
PAINLEVEL_OUTOF10: 6

## 2022-12-30 ASSESSMENT — PAIN DESCRIPTION - LOCATION: LOCATION: ANKLE

## 2022-12-30 ASSESSMENT — PAIN DESCRIPTION - PAIN TYPE: TYPE: ACUTE PAIN

## 2022-12-30 ASSESSMENT — PAIN DESCRIPTION - ORIENTATION: ORIENTATION: LEFT

## 2022-12-31 NOTE — ED PROVIDER NOTES
[facility] Emergency Department Provider Note                   PCP:                Manjeet Gutierrez MD               Age: 40 y.o. Sex: female       Yuri Epps is a 40 y.o. female who presents to the Emergency Department with chief complaint of    Chief Complaint   Patient presents with    Ankle Pain     Was walking in yard and twisted left ankle. Rates the pain a 6 on a 0-10 pain scale during triage. Patient states that yesterday she was walking her yard when she stepped in a hole and twisted her left ankle. She states she has had some pain and moderate swelling to her left lateral ankle since then. She is having trouble bearing weight. Patient denies any other injuries or complaints. The history is provided by the patient. All other systems reviewed and are negative. Review of Systems   Musculoskeletal:  Positive for arthralgias and joint swelling. Skin:  Negative for wound. Neurological:  Negative for weakness and numbness. Past Medical History:   Diagnosis Date    Abnormal Pap smear of cervix     Asthma     Ectopic pregnancy 2015    Family history of ovarian cancer     History of gestational diabetes     History of iron deficiency anemia     Due to menses. Resolved with oral iron.       History of seizures as a child         Past Surgical History:   Procedure Laterality Date    CHOLECYSTECTOMY, LAPAROSCOPIC  01/27/2021    GYN  04/2015    ectopic pregnancy surgery (right ovary and tube remain)    LEEP      SALPINGO-OOPHORECTOMY Left 12/2002    For symptomatic 10 cm cyst (pathology benign)        Family History   Problem Relation Age of Onset    Hypertension Mother     Diabetes Maternal Grandfather     Thyroid Disease Sister         hypothyroidism    Gall Bladder Disease Sister     Ovarian Cancer Sister     Ovarian Cancer Mother     Asthma Mother     Gall Bladder Disease Mother     Diabetes Niece     Other Mother         fibromyalgia    Thyroid Disease Mother         hypothyroidism Social History     Socioeconomic History    Marital status:    Tobacco Use    Smoking status: Never    Smokeless tobacco: Never   Vaping Use    Vaping Use: Never used   Substance and Sexual Activity    Alcohol use: Yes     Comment: occ    Drug use: Never    Sexual activity: Yes     Partners: Male     Birth control/protection: None     Comment: condoms        Allergies: Penicillins    Discharge Medication List as of 12/30/2022 11:44 PM        CONTINUE these medications which have NOT CHANGED    Details   Ferrous Sulfate (IRON PO) Take by mouthHistorical Med      cetirizine (ZYRTEC) 10 MG tablet Take 10 mg by mouthHistorical Med              Vitals signs and nursing note reviewed. Patient Vitals for the past 4 hrs:   Temp Pulse Resp BP SpO2   12/30/22 2345 98.6 °F (37 °C) 80 16 130/60 98 %   12/30/22 2157 98.6 °F (37 °C) 90 17 130/60 98 %          Physical Exam  Vitals and nursing note reviewed. Constitutional:       Appearance: Normal appearance. Cardiovascular:      Pulses: Normal pulses. Musculoskeletal:         General: Swelling and tenderness present. Comments: Tenderness and swelling to left lateral malleolus. Compartments are soft. No other lower extremity tenderness. Skin:     General: Skin is warm and dry. Neurological:      Mental Status: She is alert. Orders Placed This Encounter   Procedures    XR ANKLE LEFT (MIN 3 VIEWS)    [facility], Left       Procedures      Results Include:    No results found for this or any previous visit (from the past 24 hour(s)). XR ANKLE LEFT (MIN 3 VIEWS)   Final Result   Mild soft tissue swelling, with no acute fracture. MDM  Number of Diagnoses or Management Options  Sprain of left ankle, unspecified ligament, initial encounter  Diagnosis management comments: Patient presents for evaluation of left ankle pain consistent with an ankle sprain.   Patient ankle x-ray showed no fracture or dislocation. There is mild soft tissue swelling. Patient is neurovascularly intact without any signs of compartment syndrome. Patient has crutches at home. Patient was placed in Velcro ankle splint. I recommended RICE and over-the-counter pain medication. Patient was discharged home with primary care follow-up. Explanation: I have considered all emergent medical conditions that could have caused patient's presentation to the emergency department today. Based on their history, physical, and thorough evaluation I have excluded all emergent medical conditions that require any further evaluation today in the ED or hospital.  I feel that the patient is safe for discharge. The diagnosis and plan as well as the results of any testing (if done) and treatments (if done) today in the emergency department were communicated to the patient and/or their family/caregiver (if present). The patient/caregiver verbalized understanding and compliance with the treatment plan and reasons to return immediately to the emergency department. All questions were answered. ICD-10-CM    1. Sprain of left ankle, unspecified ligament, initial encounter  S93.402A           DISPOSITION Decision To Discharge 12/30/2022 11:57:17 PM       Voice dictation software was used during the making of this note. This software is not perfect and grammatical and other typographical errors may be present. This note has not been completely proofread for errors.      Yazan Mcmillan MD  12/31/22 4508

## 2022-12-31 NOTE — ED NOTES
I have reviewed discharge instructions with the patient. The patient verbalized understanding. Patient left ED via Discharge Method: wheelchair to Home with family member. Opportunity for questions and clarification provided. Patient given 0 scripts. To continue your aftercare when you leave the hospital, you may receive an automated call from our care team to check in on how you are doing. This is a free service and part of our promise to provide the best care and service to meet your aftercare needs.  If you have questions, or wish to unsubscribe from this service please call [phone]. Thank you for Choosing our [facility] Emergency Department.         Lola Parry RN  12/30/22 [id number]

## 2023-04-19 ENCOUNTER — OFFICE VISIT (OUTPATIENT)
Dept: INTERNAL MEDICINE CLINIC | Facility: CLINIC | Age: 38
End: 2023-04-19

## 2023-04-19 VITALS
DIASTOLIC BLOOD PRESSURE: 88 MMHG | WEIGHT: 215.4 LBS | HEIGHT: 67 IN | HEART RATE: 93 BPM | SYSTOLIC BLOOD PRESSURE: 130 MMHG | BODY MASS INDEX: 33.81 KG/M2

## 2023-04-19 DIAGNOSIS — Z00.00 ENCOUNTER FOR PREVENTIVE HEALTH EXAMINATION: Primary | ICD-10-CM

## 2023-04-19 DIAGNOSIS — Z86.2 HISTORY OF IRON DEFICIENCY ANEMIA: ICD-10-CM

## 2023-04-19 DIAGNOSIS — R73.01 IMPAIRED FASTING GLUCOSE: ICD-10-CM

## 2023-04-19 DIAGNOSIS — E66.09 CLASS 1 OBESITY DUE TO EXCESS CALORIES WITH BODY MASS INDEX (BMI) OF 33.0 TO 33.9 IN ADULT, UNSPECIFIED WHETHER SERIOUS COMORBIDITY PRESENT: ICD-10-CM

## 2023-04-19 DIAGNOSIS — M25.532 WRIST PAIN, CHRONIC, LEFT: ICD-10-CM

## 2023-04-19 DIAGNOSIS — E78.1 HYPERTRIGLYCERIDEMIA: ICD-10-CM

## 2023-04-19 DIAGNOSIS — G89.29 WRIST PAIN, CHRONIC, LEFT: ICD-10-CM

## 2023-04-19 DIAGNOSIS — Z86.32 HISTORY OF GESTATIONAL DIABETES: ICD-10-CM

## 2023-04-19 DIAGNOSIS — J45.20 MILD INTERMITTENT ASTHMA WITHOUT COMPLICATION: ICD-10-CM

## 2023-04-19 DIAGNOSIS — Z80.41 FAMILY HISTORY OF OVARIAN CANCER: ICD-10-CM

## 2023-04-19 DIAGNOSIS — Z98.890 S/P LEEP: ICD-10-CM

## 2023-04-19 PROBLEM — Z13.220 LIPID SCREENING: Status: RESOLVED | Noted: 2021-10-06 | Resolved: 2023-04-19

## 2023-04-19 PROBLEM — R73.03 PREDIABETES: Status: ACTIVE | Noted: 2022-04-13

## 2023-04-19 PROBLEM — E66.9 OBESITY: Status: ACTIVE | Noted: 2022-04-13

## 2023-04-19 PROBLEM — Z13.220 LIPID SCREENING: Status: ACTIVE | Noted: 2021-10-06

## 2023-04-19 SDOH — ECONOMIC STABILITY: INCOME INSECURITY: HOW HARD IS IT FOR YOU TO PAY FOR THE VERY BASICS LIKE FOOD, HOUSING, MEDICAL CARE, AND HEATING?: NOT HARD AT ALL

## 2023-04-19 SDOH — ECONOMIC STABILITY: FOOD INSECURITY: WITHIN THE PAST 12 MONTHS, THE FOOD YOU BOUGHT JUST DIDN'T LAST AND YOU DIDN'T HAVE MONEY TO GET MORE.: NEVER TRUE

## 2023-04-19 SDOH — ECONOMIC STABILITY: HOUSING INSECURITY
IN THE LAST 12 MONTHS, WAS THERE A TIME WHEN YOU DID NOT HAVE A STEADY PLACE TO SLEEP OR SLEPT IN A SHELTER (INCLUDING NOW)?: NO

## 2023-04-19 SDOH — ECONOMIC STABILITY: FOOD INSECURITY: WITHIN THE PAST 12 MONTHS, YOU WORRIED THAT YOUR FOOD WOULD RUN OUT BEFORE YOU GOT MONEY TO BUY MORE.: NEVER TRUE

## 2023-04-19 ASSESSMENT — ENCOUNTER SYMPTOMS
RECTAL PAIN: 0
EYE PAIN: 0
VOICE CHANGE: 0
STRIDOR: 0

## 2023-04-19 ASSESSMENT — PATIENT HEALTH QUESTIONNAIRE - PHQ9
SUM OF ALL RESPONSES TO PHQ QUESTIONS 1-9: 0
SUM OF ALL RESPONSES TO PHQ QUESTIONS 1-9: 0
SUM OF ALL RESPONSES TO PHQ9 QUESTIONS 1 & 2: 0
SUM OF ALL RESPONSES TO PHQ QUESTIONS 1-9: 0
1. LITTLE INTEREST OR PLEASURE IN DOING THINGS: 0
2. FEELING DOWN, DEPRESSED OR HOPELESS: 0
SUM OF ALL RESPONSES TO PHQ QUESTIONS 1-9: 0

## 2023-04-19 NOTE — PROGRESS NOTES
Diabetes Maternal Grandfather     Thyroid Disease Sister         hypothyroidism    Gall Bladder Disease Sister     Ovarian Cancer Sister     Ovarian Cancer Mother     Asthma Mother     Gall Bladder Disease Mother     Diabetes Niece     Other Mother         fibromyalgia    Thyroid Disease Mother         hypothyroidism       Social History     Socioeconomic History    Marital status:      Spouse name: Not on file    Number of children: Not on file    Years of education: Not on file    Highest education level: Not on file   Occupational History    Not on file   Tobacco Use    Smoking status: Never    Smokeless tobacco: Never   Vaping Use    Vaping Use: Never used   Substance and Sexual Activity    Alcohol use: Yes     Comment: occ    Drug use: Never    Sexual activity: Yes     Partners: Male     Birth control/protection: None     Comment: condoms   Other Topics Concern    Not on file   Social History Narrative    Not on file     Social Determinants of Health     Financial Resource Strain: Low Risk     Difficulty of Paying Living Expenses: Not hard at all   Food Insecurity: No Food Insecurity    Worried About Running Out of Food in the Last Year: Never true    [address] in the Last Year: Never true   Transportation Needs: Unknown    Lack of Transportation (Medical): Not on file    Lack of Transportation (Non-Medical): No   Physical Activity: Not on file   Stress: Not on file   Social Connections: Not on file   Intimate Partner Violence: Not on file   Housing Stability: Unknown    Unable to Pay for Housing in the Last Year: Not on file    Number of Places Lived in the Last Year: Not on file    Unstable Housing in the Last Year: No       Current Outpatient Medications   Medication Sig Dispense Refill    Ferrous Sulfate (IRON PO) Take by mouth      cetirizine (ZYRTEC) 10 MG tablet Take 1 tablet by mouth       No current facility-administered medications for this visit.        Allergies as of 04/19/2023 - Fully

## 2023-05-19 PROBLEM — Z00.00 ENCOUNTER FOR PREVENTIVE HEALTH EXAMINATION: Status: RESOLVED | Noted: 2023-04-19 | Resolved: 2023-05-19

## 2023-11-02 ENCOUNTER — OFFICE VISIT (OUTPATIENT)
Dept: OBGYN CLINIC | Age: 38
End: 2023-11-02
Payer: COMMERCIAL

## 2023-11-02 VITALS
BODY MASS INDEX: 33.27 KG/M2 | DIASTOLIC BLOOD PRESSURE: 72 MMHG | WEIGHT: 212 LBS | SYSTOLIC BLOOD PRESSURE: 120 MMHG | HEIGHT: 67 IN

## 2023-11-02 DIAGNOSIS — Z11.51 SCREENING FOR HUMAN PAPILLOMAVIRUS (HPV): ICD-10-CM

## 2023-11-02 DIAGNOSIS — Z12.4 SCREENING FOR CERVICAL CANCER: ICD-10-CM

## 2023-11-02 DIAGNOSIS — Z01.419 WELL WOMAN EXAM WITH ROUTINE GYNECOLOGICAL EXAM: Primary | ICD-10-CM

## 2023-11-02 PROCEDURE — 99395 PREV VISIT EST AGE 18-39: CPT | Performed by: OBSTETRICS & GYNECOLOGY

## 2023-11-02 PROCEDURE — G8484 FLU IMMUNIZE NO ADMIN: HCPCS | Performed by: OBSTETRICS & GYNECOLOGY

## 2023-11-09 LAB
CYTOLOGIST CVX/VAG CYTO: NORMAL
CYTOLOGY CVX/VAG DOC THIN PREP: NORMAL
HPV APTIMA: NEGATIVE
Lab: NORMAL
PATH REPORT.FINAL DX SPEC: NORMAL
STAT OF ADQ CVX/VAG CYTO-IMP: NORMAL

## 2023-11-14 NOTE — PROGRESS NOTES
Patient presents today for   Chief Complaint   Patient presents with    Annual Exam       Reports regular menses  LMP: Patient's last menstrual period was 10/18/2023. Contraception: condoms        Allergies   Allergen Reactions    Penicillins Hives     Current Outpatient Medications   Medication Sig Dispense Refill    Ferrous Sulfate (IRON PO) Take by mouth      cetirizine (ZYRTEC) 10 MG tablet Take 1 tablet by mouth       No current facility-administered medications for this visit. Past Medical History:   Diagnosis Date    Abnormal Pap smear of cervix     Asthma     Ectopic pregnancy 2015    Family history of ovarian cancer     History of gestational diabetes     History of iron deficiency anemia     Due to menses. Resolved with oral iron.       History of seizures as a child      Past Surgical History:   Procedure Laterality Date    CHOLECYSTECTOMY, LAPAROSCOPIC  01/27/2021    GYN  04/2015    ectopic pregnancy surgery (right ovary and tube remain)    LEEP      SALPINGO-OOPHORECTOMY Left 12/2002    For symptomatic 10 cm cyst (pathology benign)     Social History     Socioeconomic History    Marital status:      Spouse name: Not on file    Number of children: Not on file    Years of education: Not on file    Highest education level: Not on file   Occupational History    Not on file   Tobacco Use    Smoking status: Never    Smokeless tobacco: Never   Vaping Use    Vaping Use: Never used   Substance and Sexual Activity    Alcohol use: Yes     Comment: occ    Drug use: Never    Sexual activity: Yes     Partners: Male     Birth control/protection: None     Comment: condoms   Other Topics Concern    Not on file   Social History Narrative    Not on file     Social Determinants of Health     Financial Resource Strain: Low Risk  (4/19/2023)    Overall Financial Resource Strain (CARDIA)     Difficulty of Paying Living Expenses: Not hard at all   Food Insecurity: No Food Insecurity (4/19/2023)    Hunger Vital Sign

## 2024-04-24 ENCOUNTER — OFFICE VISIT (OUTPATIENT)
Dept: INTERNAL MEDICINE CLINIC | Facility: CLINIC | Age: 39
End: 2024-04-24
Payer: COMMERCIAL

## 2024-04-24 VITALS
WEIGHT: 209 LBS | OXYGEN SATURATION: 97 % | SYSTOLIC BLOOD PRESSURE: 100 MMHG | DIASTOLIC BLOOD PRESSURE: 70 MMHG | HEART RATE: 87 BPM | HEIGHT: 67 IN | BODY MASS INDEX: 32.8 KG/M2

## 2024-04-24 DIAGNOSIS — J45.20 MILD INTERMITTENT ASTHMA WITHOUT COMPLICATION: ICD-10-CM

## 2024-04-24 DIAGNOSIS — Z00.00 ENCOUNTER FOR PREVENTIVE HEALTH EXAMINATION: Primary | Chronic | ICD-10-CM

## 2024-04-24 DIAGNOSIS — J30.89 ENVIRONMENTAL AND SEASONAL ALLERGIES: ICD-10-CM

## 2024-04-24 DIAGNOSIS — R73.01 IMPAIRED FASTING GLUCOSE: ICD-10-CM

## 2024-04-24 DIAGNOSIS — E66.09 CLASS 1 OBESITY DUE TO EXCESS CALORIES WITH BODY MASS INDEX (BMI) OF 32.0 TO 32.9 IN ADULT, UNSPECIFIED WHETHER SERIOUS COMORBIDITY PRESENT: ICD-10-CM

## 2024-04-24 DIAGNOSIS — E78.1 HYPERTRIGLYCERIDEMIA: ICD-10-CM

## 2024-04-24 DIAGNOSIS — Z86.32 HISTORY OF GESTATIONAL DIABETES: ICD-10-CM

## 2024-04-24 DIAGNOSIS — Z80.41 FAMILY HISTORY OF OVARIAN CANCER: ICD-10-CM

## 2024-04-24 DIAGNOSIS — Z86.2 HISTORY OF IRON DEFICIENCY ANEMIA: ICD-10-CM

## 2024-04-24 PROCEDURE — 99395 PREV VISIT EST AGE 18-39: CPT | Performed by: INTERNAL MEDICINE

## 2024-04-24 RX ORDER — TRIAMCINOLONE ACETONIDE 55 UG/1
2 SPRAY, METERED NASAL DAILY
Qty: 1 EACH | Refills: 3 | Status: SHIPPED | OUTPATIENT
Start: 2024-04-24

## 2024-04-24 ASSESSMENT — PATIENT HEALTH QUESTIONNAIRE - PHQ9
2. FEELING DOWN, DEPRESSED OR HOPELESS: NOT AT ALL
SUM OF ALL RESPONSES TO PHQ9 QUESTIONS 1 & 2: 0
SUM OF ALL RESPONSES TO PHQ QUESTIONS 1-9: 0
1. LITTLE INTEREST OR PLEASURE IN DOING THINGS: NOT AT ALL
SUM OF ALL RESPONSES TO PHQ9 QUESTIONS 1 & 2: 0
SUM OF ALL RESPONSES TO PHQ QUESTIONS 1-9: 0
SUM OF ALL RESPONSES TO PHQ QUESTIONS 1-9: 0
1. LITTLE INTEREST OR PLEASURE IN DOING THINGS: NOT AT ALL
SUM OF ALL RESPONSES TO PHQ QUESTIONS 1-9: 0
2. FEELING DOWN, DEPRESSED OR HOPELESS: NOT AT ALL

## 2024-04-24 ASSESSMENT — ENCOUNTER SYMPTOMS
RECTAL PAIN: 0
VOICE CHANGE: 0
STRIDOR: 0
EYE PAIN: 0

## 2024-04-24 NOTE — PROGRESS NOTES
Never    Smokeless tobacco: Never   Vaping Use    Vaping Use: Never used   Substance and Sexual Activity    Alcohol use: Yes     Comment: occ    Drug use: Never    Sexual activity: Yes     Partners: Male     Birth control/protection: None     Comment: condoms   Other Topics Concern    Not on file   Social History Narrative    Not on file     Social Determinants of Health     Financial Resource Strain: Low Risk  (4/19/2023)    Overall Financial Resource Strain (CARDIA)     Difficulty of Paying Living Expenses: Not hard at all   Food Insecurity: Not on file (4/19/2023)   Transportation Needs: Unknown (4/19/2023)    PRAPARE - Transportation     Lack of Transportation (Medical): Not on file     Lack of Transportation (Non-Medical): No   Physical Activity: Not on file   Stress: Not on file   Social Connections: Not on file   Intimate Partner Violence: Not on file   Housing Stability: Unknown (4/19/2023)    Housing Stability Vital Sign     Unable to Pay for Housing in the Last Year: Not on file     Number of Places Lived in the Last Year: Not on file     Unstable Housing in the Last Year: No       Current Outpatient Medications   Medication Sig Dispense Refill    triamcinolone (NASACORT ALLERGY 24HR) 55 MCG/ACT nasal inhaler 2 sprays by Each Nostril route daily 1 each 3    Ferrous Sulfate (IRON PO) Take by mouth      cetirizine (ZYRTEC) 10 MG tablet Take 1 tablet by mouth       No current facility-administered medications for this visit.       Allergies as of 04/24/2024 - Fully Reviewed 04/24/2024   Allergen Reaction Noted    Penicillins Hives 04/09/2015       Review of Systems   Constitutional:  Negative for activity change and appetite change.   HENT:  Negative for drooling and voice change.    Eyes:  Negative for pain.   Respiratory:  Negative for stridor.    Gastrointestinal:  Negative for rectal pain.   Endocrine: Negative for polydipsia and polyphagia.   Genitourinary:  Negative for dyspareunia and enuresis.

## 2024-12-04 ENCOUNTER — OFFICE VISIT (OUTPATIENT)
Dept: INTERNAL MEDICINE CLINIC | Facility: CLINIC | Age: 39
End: 2024-12-04
Payer: COMMERCIAL

## 2024-12-04 VITALS
WEIGHT: 214.6 LBS | OXYGEN SATURATION: 99 % | SYSTOLIC BLOOD PRESSURE: 120 MMHG | HEIGHT: 67 IN | BODY MASS INDEX: 33.68 KG/M2 | HEART RATE: 90 BPM | DIASTOLIC BLOOD PRESSURE: 80 MMHG

## 2024-12-04 DIAGNOSIS — J06.9 UPPER RESPIRATORY TRACT INFECTION, UNSPECIFIED TYPE: Primary | ICD-10-CM

## 2024-12-04 PROCEDURE — 99213 OFFICE O/P EST LOW 20 MIN: CPT | Performed by: INTERNAL MEDICINE

## 2024-12-04 RX ORDER — AZITHROMYCIN 250 MG/1
TABLET, FILM COATED ORAL
Qty: 6 TABLET | Refills: 0 | Status: SHIPPED | OUTPATIENT
Start: 2024-12-04 | End: 2024-12-14

## 2024-12-04 SDOH — ECONOMIC STABILITY: FOOD INSECURITY: WITHIN THE PAST 12 MONTHS, THE FOOD YOU BOUGHT JUST DIDN'T LAST AND YOU DIDN'T HAVE MONEY TO GET MORE.: NEVER TRUE

## 2024-12-04 SDOH — ECONOMIC STABILITY: FOOD INSECURITY: WITHIN THE PAST 12 MONTHS, YOU WORRIED THAT YOUR FOOD WOULD RUN OUT BEFORE YOU GOT MONEY TO BUY MORE.: NEVER TRUE

## 2024-12-04 SDOH — ECONOMIC STABILITY: INCOME INSECURITY: HOW HARD IS IT FOR YOU TO PAY FOR THE VERY BASICS LIKE FOOD, HOUSING, MEDICAL CARE, AND HEATING?: NOT HARD AT ALL

## 2024-12-04 ASSESSMENT — ENCOUNTER SYMPTOMS
RECTAL PAIN: 0
STRIDOR: 0
VOICE CHANGE: 0
EYE PAIN: 0

## 2024-12-04 NOTE — PROGRESS NOTES
FOLLOWUP VISIT    Subjective:    Ms. Coronado is a 39 y.o., female,   Chief Complaint   Patient presents with    Follow-up     [facility] urgent [facility] last night With sneezing and fatigue x started Monday night, covid and influenza negative        HPI:    For the last 3 days the patient has felt mildly ill.  She endorses fatigue, chest congestion, nasal congestion, sinus pressure.  No fever but she felt chilled yesterday.  Daughter and mother at home both ill with \"walking pneumonia.\"  Patient's daughter was diagnosed by pediatrician with walking pneumonia and treated with a Z pack.      The following portions of the patient's history were reviewed and updated as appropriate:      Past Medical History:   Diagnosis Date    Abnormal Pap smear of cervix     Asthma     Ectopic pregnancy 2015    Family history of ovarian cancer     History of gestational diabetes     History of iron deficiency anemia     Due to menses.  Resolved with oral iron.      History of seizures as a child        Past Surgical History:   Procedure Laterality Date    CHOLECYSTECTOMY, LAPAROSCOPIC  01/27/2021    GYN  04/2015    ectopic pregnancy surgery (right ovary and tube remain)    LEEP      SALPINGO-OOPHORECTOMY Left 12/2002    For symptomatic 10 cm cyst (pathology benign)       Family History   Problem Relation Age of Onset    Hypertension Mother     Diabetes Maternal Grandfather     Thyroid Disease Sister         hypothyroidism    Gall Bladder Disease Sister     Ovarian Cancer Sister     Ovarian Cancer Mother     Asthma Mother     Gall Bladder Disease Mother     Diabetes Niece     Other Mother         fibromyalgia    Thyroid Disease Mother         hypothyroidism       Social History     Socioeconomic History    Marital status:      Spouse name: Not on file    Number of children: Not on file    Years of education: Not on file    Highest education level: Not on file   Occupational History    Not on file   Tobacco Use    Smoking status: Never

## 2025-01-03 PROBLEM — J06.9 URI (UPPER RESPIRATORY INFECTION): Status: RESOLVED | Noted: 2024-12-04 | Resolved: 2025-01-03

## 2025-04-18 DIAGNOSIS — Z00.00 ROUTINE GENERAL MEDICAL EXAMINATION AT A HEALTH CARE FACILITY: Primary | ICD-10-CM

## 2025-05-14 ENCOUNTER — OFFICE VISIT (OUTPATIENT)
Dept: OBGYN CLINIC | Age: 40
End: 2025-05-14
Payer: COMMERCIAL

## 2025-05-14 VITALS
DIASTOLIC BLOOD PRESSURE: 68 MMHG | WEIGHT: 214 LBS | BODY MASS INDEX: 33.59 KG/M2 | HEIGHT: 67 IN | SYSTOLIC BLOOD PRESSURE: 112 MMHG

## 2025-05-14 DIAGNOSIS — Z12.4 SCREENING FOR CERVICAL CANCER: ICD-10-CM

## 2025-05-14 DIAGNOSIS — Z12.31 ENCOUNTER FOR SCREENING MAMMOGRAM FOR MALIGNANT NEOPLASM OF BREAST: ICD-10-CM

## 2025-05-14 DIAGNOSIS — Z11.51 SCREENING FOR HUMAN PAPILLOMAVIRUS (HPV): ICD-10-CM

## 2025-05-14 DIAGNOSIS — Z01.419 WELL WOMAN EXAM WITH ROUTINE GYNECOLOGICAL EXAM: Primary | ICD-10-CM

## 2025-05-14 PROCEDURE — 99396 PREV VISIT EST AGE 40-64: CPT | Performed by: OBSTETRICS & GYNECOLOGY

## 2025-05-14 RX ORDER — TRANEXAMIC ACID 650 MG/1
1300 TABLET ORAL 3 TIMES DAILY PRN
Qty: 30 TABLET | Refills: 1 | Status: SHIPPED | OUTPATIENT
Start: 2025-05-14

## 2025-05-14 SDOH — ECONOMIC STABILITY: FOOD INSECURITY: WITHIN THE PAST 12 MONTHS, YOU WORRIED THAT YOUR FOOD WOULD RUN OUT BEFORE YOU GOT MONEY TO BUY MORE.: NEVER TRUE

## 2025-05-14 SDOH — ECONOMIC STABILITY: FOOD INSECURITY: WITHIN THE PAST 12 MONTHS, THE FOOD YOU BOUGHT JUST DIDN'T LAST AND YOU DIDN'T HAVE MONEY TO GET MORE.: NEVER TRUE

## 2025-05-14 NOTE — PROGRESS NOTES
Patient presents today for   Chief Complaint   Patient presents with    Annual Exam       Regular menses, moderate to heavy (pt does not feel she has very heavy periods, however, blood on exam today appears consistent with most other patient's heaviest days per my medical opinion).  LMP: Patient's last menstrual period was 05/11/2025.  Contraception: condoms        Allergies   Allergen Reactions    Penicillins Hives     Current Outpatient Medications   Medication Sig Dispense Refill    tranexamic acid (LYSTEDA) 650 MG TABS tablet Take 2 tablets by mouth 3 times daily as needed (Heavy menstrual bleeding) 30 tablet 1    Ferrous Sulfate (IRON PO) Take by mouth      cetirizine (ZYRTEC) 10 MG tablet Take 1 tablet by mouth       No current facility-administered medications for this visit.     Past Medical History:   Diagnosis Date    Abnormal Pap smear of cervix     Asthma     Ectopic pregnancy 2015    Family history of ovarian cancer     History of gestational diabetes     History of iron deficiency anemia     Due to menses.  Resolved with oral iron.      History of seizures as a child      Past Surgical History:   Procedure Laterality Date    CHOLECYSTECTOMY, LAPAROSCOPIC  01/27/2021    GYN  04/2015    ectopic pregnancy surgery (right ovary and tube remain)    LEEP      SALPINGO-OOPHORECTOMY Left 12/2002    For symptomatic 10 cm cyst (pathology benign)     Social History     Socioeconomic History    Marital status:      Spouse name: Not on file    Number of children: Not on file    Years of education: Not on file    Highest education level: Not on file   Occupational History    Not on file   Tobacco Use    Smoking status: Never    Smokeless tobacco: Never   Vaping Use    Vaping status: Never Used   Substance and Sexual Activity    Alcohol use: Yes     Comment: occ    Drug use: Never    Sexual activity: Yes     Partners: Male     Birth control/protection: None     Comment: condoms   Other Topics Concern    Not on

## 2025-05-21 LAB
COLLECTION METHOD: NORMAL
CYTOLOGIST CVX/VAG CYTO: NORMAL
CYTOLOGY CVX/VAG DOC THIN PREP: NORMAL
DATE OF LMP: 0
HPV APTIMA: NEGATIVE
Lab: NORMAL
Lab: NORMAL
PAP SOURCE: NORMAL
PATH REPORT.FINAL DX SPEC: NORMAL
STAT OF ADQ CVX/VAG CYTO-IMP: NORMAL

## 2025-06-05 DIAGNOSIS — Z00.00 ROUTINE GENERAL MEDICAL EXAMINATION AT A HEALTH CARE FACILITY: ICD-10-CM

## 2025-06-05 LAB
ALBUMIN SERPL-MCNC: 4 G/DL (ref 3.5–5)
ALBUMIN/GLOB SERPL: 1 (ref 1–1.9)
ALP SERPL-CCNC: 85 U/L (ref 35–104)
ALT SERPL-CCNC: 16 U/L (ref 8–45)
ANION GAP SERPL CALC-SCNC: 11 MMOL/L (ref 7–16)
AST SERPL-CCNC: 21 U/L (ref 15–37)
BASOPHILS # BLD: 0.08 K/UL (ref 0–0.2)
BASOPHILS NFR BLD: 1 % (ref 0–2)
BILIRUB SERPL-MCNC: 0.5 MG/DL (ref 0–1.2)
BUN SERPL-MCNC: 10 MG/DL (ref 6–23)
CALCIUM SERPL-MCNC: 9.6 MG/DL (ref 8.8–10.2)
CHLORIDE SERPL-SCNC: 103 MMOL/L (ref 98–107)
CHOLEST SERPL-MCNC: 145 MG/DL (ref 0–200)
CO2 SERPL-SCNC: 24 MMOL/L (ref 20–29)
CREAT SERPL-MCNC: 0.82 MG/DL (ref 0.6–1.1)
DIFFERENTIAL METHOD BLD: ABNORMAL
EOSINOPHIL # BLD: 0.15 K/UL (ref 0–0.8)
EOSINOPHIL NFR BLD: 1.8 % (ref 0.5–7.8)
ERYTHROCYTE [DISTWIDTH] IN BLOOD BY AUTOMATED COUNT: 14.6 % (ref 11.9–14.6)
EST. AVERAGE GLUCOSE BLD GHB EST-MCNC: 122 MG/DL
FERRITIN SERPL-MCNC: 37 NG/ML (ref 8–388)
GLOBULIN SER CALC-MCNC: 3.8 G/DL (ref 2.3–3.5)
GLUCOSE SERPL-MCNC: 107 MG/DL (ref 70–99)
HBA1C MFR BLD: 5.9 % (ref 0–5.6)
HCT VFR BLD AUTO: 35.5 % (ref 35.8–46.3)
HDLC SERPL-MCNC: 36 MG/DL (ref 40–60)
HDLC SERPL: 4 (ref 0–5)
HGB BLD-MCNC: 10.8 G/DL (ref 11.7–15.4)
IMM GRANULOCYTES # BLD AUTO: 0.02 K/UL (ref 0–0.5)
IMM GRANULOCYTES NFR BLD AUTO: 0.2 % (ref 0–5)
LDLC SERPL CALC-MCNC: 85 MG/DL (ref 0–100)
LYMPHOCYTES # BLD: 2.43 K/UL (ref 0.5–4.6)
LYMPHOCYTES NFR BLD: 29.9 % (ref 13–44)
MCH RBC QN AUTO: 26.2 PG (ref 26.1–32.9)
MCHC RBC AUTO-ENTMCNC: 30.4 G/DL (ref 31.4–35)
MCV RBC AUTO: 86 FL (ref 82–102)
MONOCYTES # BLD: 0.5 K/UL (ref 0.1–1.3)
MONOCYTES NFR BLD: 6.1 % (ref 4–12)
NEUTS SEG # BLD: 4.96 K/UL (ref 1.7–8.2)
NEUTS SEG NFR BLD: 61 % (ref 43–78)
NRBC # BLD: 0 K/UL (ref 0–0.2)
PLATELET # BLD AUTO: 319 K/UL (ref 150–450)
PMV BLD AUTO: 11 FL (ref 9.4–12.3)
POTASSIUM SERPL-SCNC: 4.2 MMOL/L (ref 3.5–5.1)
PROT SERPL-MCNC: 7.8 G/DL (ref 6.3–8.2)
RBC # BLD AUTO: 4.13 M/UL (ref 4.05–5.2)
SODIUM SERPL-SCNC: 137 MMOL/L (ref 136–145)
TRIGL SERPL-MCNC: 120 MG/DL (ref 0–150)
TSH W FREE THYROID IF ABNORMAL: 1.6 UIU/ML (ref 0.27–4.2)
VLDLC SERPL CALC-MCNC: 24 MG/DL (ref 6–23)
WBC # BLD AUTO: 8.1 K/UL (ref 4.3–11.1)

## 2025-06-08 ENCOUNTER — RESULTS FOLLOW-UP (OUTPATIENT)
Dept: INTERNAL MEDICINE CLINIC | Facility: CLINIC | Age: 40
End: 2025-06-08

## 2025-06-09 ENCOUNTER — OFFICE VISIT (OUTPATIENT)
Dept: INTERNAL MEDICINE CLINIC | Facility: CLINIC | Age: 40
End: 2025-06-09
Payer: COMMERCIAL

## 2025-06-09 VITALS
SYSTOLIC BLOOD PRESSURE: 120 MMHG | HEART RATE: 61 BPM | WEIGHT: 214 LBS | BODY MASS INDEX: 33.59 KG/M2 | HEIGHT: 67 IN | DIASTOLIC BLOOD PRESSURE: 70 MMHG

## 2025-06-09 DIAGNOSIS — Z00.00 ENCOUNTER FOR PREVENTIVE HEALTH EXAMINATION: Primary | Chronic | ICD-10-CM

## 2025-06-09 DIAGNOSIS — Z86.32 HISTORY OF GESTATIONAL DIABETES: ICD-10-CM

## 2025-06-09 DIAGNOSIS — D64.9 ANEMIA, UNSPECIFIED TYPE: ICD-10-CM

## 2025-06-09 DIAGNOSIS — J45.20 MILD INTERMITTENT ASTHMA WITHOUT COMPLICATION: ICD-10-CM

## 2025-06-09 DIAGNOSIS — E78.1 HYPERTRIGLYCERIDEMIA: ICD-10-CM

## 2025-06-09 DIAGNOSIS — R73.01 IMPAIRED FASTING GLUCOSE: ICD-10-CM

## 2025-06-09 PROCEDURE — 99396 PREV VISIT EST AGE 40-64: CPT | Performed by: INTERNAL MEDICINE

## 2025-06-09 ASSESSMENT — PATIENT HEALTH QUESTIONNAIRE - PHQ9
SUM OF ALL RESPONSES TO PHQ QUESTIONS 1-9: 0
2. FEELING DOWN, DEPRESSED OR HOPELESS: NOT AT ALL
SUM OF ALL RESPONSES TO PHQ QUESTIONS 1-9: 0
1. LITTLE INTEREST OR PLEASURE IN DOING THINGS: NOT AT ALL

## 2025-06-09 ASSESSMENT — ENCOUNTER SYMPTOMS
EYE PAIN: 0
RECTAL PAIN: 0
VOICE CHANGE: 0
STRIDOR: 0

## 2025-06-13 ENCOUNTER — HOSPITAL ENCOUNTER (OUTPATIENT)
Dept: MAMMOGRAPHY | Age: 40
Discharge: HOME | End: 2025-06-16
Attending: OBSTETRICS & GYNECOLOGY
Payer: COMMERCIAL

## 2025-06-13 DIAGNOSIS — Z12.31 ENCOUNTER FOR SCREENING MAMMOGRAM FOR MALIGNANT NEOPLASM OF BREAST: ICD-10-CM

## 2025-06-13 PROCEDURE — 77067 SCR MAMMO BI INCL CAD: CPT

## 2025-07-01 ENCOUNTER — HOSPITAL ENCOUNTER (OUTPATIENT)
Dept: MAMMOGRAPHY | Age: 40
Discharge: HOME | End: 2025-07-04
Attending: OBSTETRICS & GYNECOLOGY
Payer: COMMERCIAL

## 2025-07-01 DIAGNOSIS — R92.8 ABNORMAL SCREENING MAMMOGRAM: ICD-10-CM

## 2025-07-01 PROCEDURE — 76642 ULTRASOUND BREAST LIMITED: CPT

## 2025-07-01 PROCEDURE — G0279 TOMOSYNTHESIS, MAMMO: HCPCS

## 2025-07-10 ENCOUNTER — OFFICE VISIT (OUTPATIENT)
Dept: OBGYN CLINIC | Age: 40
End: 2025-07-10
Payer: COMMERCIAL

## 2025-07-10 ENCOUNTER — PROCEDURE VISIT (OUTPATIENT)
Dept: OBGYN CLINIC | Age: 40
End: 2025-07-10
Payer: COMMERCIAL

## 2025-07-10 VITALS
SYSTOLIC BLOOD PRESSURE: 132 MMHG | DIASTOLIC BLOOD PRESSURE: 78 MMHG | WEIGHT: 216 LBS | HEIGHT: 67 IN | BODY MASS INDEX: 33.9 KG/M2

## 2025-07-10 DIAGNOSIS — N92.0 MENORRHAGIA WITH REGULAR CYCLE: ICD-10-CM

## 2025-07-10 DIAGNOSIS — N93.9 ABNORMAL UTERINE BLEEDING (AUB): Primary | ICD-10-CM

## 2025-07-10 DIAGNOSIS — D25.1 INTRAMURAL LEIOMYOMA OF UTERUS: ICD-10-CM

## 2025-07-10 DIAGNOSIS — N80.03 ADENOMYOSIS OF UTERUS: ICD-10-CM

## 2025-07-10 DIAGNOSIS — Z80.41 FAMILY HISTORY OF OVARIAN CANCER: ICD-10-CM

## 2025-07-10 DIAGNOSIS — N92.0 MENORRHAGIA WITH REGULAR CYCLE: Primary | ICD-10-CM

## 2025-07-10 DIAGNOSIS — Z11.51 SCREENING FOR HUMAN PAPILLOMAVIRUS (HPV): ICD-10-CM

## 2025-07-10 DIAGNOSIS — D50.0 IRON DEFICIENCY ANEMIA DUE TO CHRONIC BLOOD LOSS: ICD-10-CM

## 2025-07-10 DIAGNOSIS — Z12.4 SCREENING FOR CERVICAL CANCER: ICD-10-CM

## 2025-07-10 PROCEDURE — 99214 OFFICE O/P EST MOD 30 MIN: CPT | Performed by: OBSTETRICS & GYNECOLOGY

## 2025-07-10 PROCEDURE — 76830 TRANSVAGINAL US NON-OB: CPT | Performed by: OBSTETRICS & GYNECOLOGY

## 2025-07-17 LAB
COLLECTION METHOD: NORMAL
CYTOLOGIST CVX/VAG CYTO: NORMAL
CYTOLOGY CVX/VAG DOC THIN PREP: NORMAL
HPV APTIMA: NEGATIVE
Lab: NORMAL
Lab: NORMAL
PAP SOURCE: NORMAL
PATH REPORT.FINAL DX SPEC: NORMAL
STAT OF ADQ CVX/VAG CYTO-IMP: NORMAL

## 2025-07-22 PROBLEM — D25.1 INTRAMURAL LEIOMYOMA OF UTERUS: Status: ACTIVE | Noted: 2025-07-22

## 2025-07-22 PROBLEM — N92.0 MENORRHAGIA WITH REGULAR CYCLE: Status: ACTIVE | Noted: 2025-07-22

## 2025-07-22 PROBLEM — N93.9 ABNORMAL UTERINE BLEEDING (AUB): Status: ACTIVE | Noted: 2025-07-22

## 2025-07-22 NOTE — PROGRESS NOTES
Name: Isha GROSS Ivel     : 1985    Physicians Information    Recommended Surgery: LAVH, RS, cysto  Place: [facility]   When: TBD  Diagnosis:   Diagnosis Orders   1. Abnormal uterine bleeding (AUB)        2. Menorrhagia with regular cycle        3. Intramural leiomyoma of uterus        4. Iron deficiency anemia due to chronic blood loss        5. Family history of ovarian cancer        6. Adenomyosis of uterus        7. Screening for cervical cancer  PAP IG, HPV Rfx HPV 16/18,45    PAP IG, HPV Rfx HPV 16/18,45      8. Screening for human papillomavirus (HPV)  PAP IG, HPV Rfx HPV 16/18,45    PAP IG, HPV Rfx HPV 16/18,45        Time Needed:  Surgeon:Julia Graham MD  Assistant Needed: Jackeline David CST  Special Request:    Surgery Department Information    Date Scheduled: _____________________ Hospital Pre-Op:______________________        Time Scheduled : ____________________ Surgery Entered: _____________________    Facility: ____________________________ Patient Notified: ______________________    Pre-Op: _______________________ Orders Completed: ___________________

## 2025-07-22 NOTE — PROGRESS NOTES
7/10/25:    Patient presents today for   Chief Complaint   Patient presents with    Ultrasound     Heavy menses - see last note.  LMP: No LMP recorded.  Contraception: none        Allergies   Allergen Reactions    Penicillins Hives     Current Outpatient Medications   Medication Sig Dispense Refill    tranexamic acid (LYSTEDA) 650 MG TABS tablet Take 2 tablets by mouth 3 times daily as needed (Heavy menstrual bleeding) 30 tablet 1    Ferrous Sulfate (IRON PO) Take by mouth      cetirizine (ZYRTEC) 10 MG tablet Take 1 tablet by mouth       No current facility-administered medications for this visit.     Past Medical History:   Diagnosis Date    Abnormal Pap smear of cervix     Asthma     Ectopic pregnancy 2015    Family history of ovarian cancer     History of gestational diabetes     History of iron deficiency anemia     Due to menses.  Resolved with oral iron.      History of seizures as a child      Past Surgical History:   Procedure Laterality Date    CHOLECYSTECTOMY, LAPAROSCOPIC  01/27/2021    GYN  04/2015    ectopic pregnancy surgery (right ovary and tube remain)    LEEP      SALPINGO-OOPHORECTOMY Left 12/2002    For symptomatic 10 cm cyst (pathology benign)     Social History     Socioeconomic History    Marital status:      Spouse name: Not on file    Number of children: Not on file    Years of education: Not on file    Highest education level: Not on file   Occupational History    Not on file   Tobacco Use    Smoking status: Never    Smokeless tobacco: Never   Vaping Use    Vaping status: Never Used   Substance and Sexual Activity    Alcohol use: Yes     Comment: occ    Drug use: Never    Sexual activity: Yes     Partners: Male     Birth control/protection: None     Comment: condoms   Other Topics Concern    Not on file   Social History Narrative    Not on file     Social Drivers of Health     Financial Resource Strain: Low Risk  (12/4/2024)    Overall Financial Resource Strain (CARDIA)